# Patient Record
Sex: FEMALE | Race: WHITE | URBAN - METROPOLITAN AREA
[De-identification: names, ages, dates, MRNs, and addresses within clinical notes are randomized per-mention and may not be internally consistent; named-entity substitution may affect disease eponyms.]

---

## 2017-03-15 ENCOUNTER — HOSPITAL ENCOUNTER (OUTPATIENT)
Dept: MAMMOGRAPHY | Age: 57
Discharge: HOME | End: 2017-03-15
Attending: FAMILY MEDICINE | Admitting: FAMILY MEDICINE

## 2017-03-15 DIAGNOSIS — Z12.31 VISIT FOR SCREENING MAMMOGRAM: ICD-10-CM

## 2017-10-03 ENCOUNTER — OFFICE VISIT (OUTPATIENT)
Dept: FAMILY MEDICINE CLINIC | Age: 57
End: 2017-10-03

## 2017-10-03 VITALS
SYSTOLIC BLOOD PRESSURE: 118 MMHG | RESPIRATION RATE: 14 BRPM | BODY MASS INDEX: 23.05 KG/M2 | WEIGHT: 135 LBS | OXYGEN SATURATION: 98 % | HEIGHT: 64 IN | HEART RATE: 71 BPM | DIASTOLIC BLOOD PRESSURE: 76 MMHG

## 2017-10-03 DIAGNOSIS — R11.2 NON-INTRACTABLE VOMITING WITH NAUSEA, UNSPECIFIED VOMITING TYPE: ICD-10-CM

## 2017-10-03 DIAGNOSIS — H81.312 VERTIGO, AURAL, LEFT: Primary | ICD-10-CM

## 2017-10-03 PROCEDURE — 96372 THER/PROPH/DIAG INJ SC/IM: CPT | Performed by: FAMILY MEDICINE

## 2017-10-03 PROCEDURE — 99214 OFFICE O/P EST MOD 30 MIN: CPT | Performed by: FAMILY MEDICINE

## 2017-10-03 RX ORDER — ONDANSETRON 4 MG/1
4 TABLET, FILM COATED ORAL EVERY 6 HOURS PRN
Qty: 6 TABLET | Refills: 0 | Status: SHIPPED | OUTPATIENT
Start: 2017-10-03 | End: 2021-03-22

## 2017-10-03 RX ORDER — AMOXICILLIN 500 MG/1
CAPSULE ORAL
COMMUNITY
Start: 2017-09-28 | End: 2021-03-22

## 2017-10-03 RX ORDER — PROMETHAZINE HYDROCHLORIDE 25 MG/ML
25 INJECTION, SOLUTION INTRAMUSCULAR; INTRAVENOUS ONCE
Status: COMPLETED | OUTPATIENT
Start: 2017-10-03 | End: 2017-10-03

## 2017-10-03 RX ADMIN — PROMETHAZINE HYDROCHLORIDE 25 MG: 25 INJECTION, SOLUTION INTRAMUSCULAR; INTRAVENOUS at 11:22

## 2017-10-03 ASSESSMENT — PATIENT HEALTH QUESTIONNAIRE - PHQ9
1. LITTLE INTEREST OR PLEASURE IN DOING THINGS: 0
SUM OF ALL RESPONSES TO PHQ9 QUESTIONS 1 & 2: 0
SUM OF ALL RESPONSES TO PHQ QUESTIONS 1-9: 0
2. FEELING DOWN, DEPRESSED OR HOPELESS: 0

## 2017-10-05 ENCOUNTER — TELEPHONE (OUTPATIENT)
Dept: FAMILY MEDICINE CLINIC | Age: 57
End: 2017-10-05

## 2017-10-05 RX ORDER — DIAZEPAM 2 MG/1
2 TABLET ORAL EVERY 8 HOURS PRN
Qty: 15 TABLET | Refills: 1 | Status: SHIPPED | OUTPATIENT
Start: 2017-10-05 | End: 2017-10-15

## 2018-02-10 LAB
ALBUMIN SERPL-MCNC: 4.4 G/DL
ALP BLD-CCNC: 72 U/L
ALT SERPL-CCNC: 16 U/L
ANION GAP SERPL CALCULATED.3IONS-SCNC: NORMAL MMOL/L
AST SERPL-CCNC: 26 U/L
AVERAGE GLUCOSE: NORMAL
BASOPHILS ABSOLUTE: 0 /ΜL
BASOPHILS RELATIVE PERCENT: 1 %
BILIRUB SERPL-MCNC: 0.7 MG/DL (ref 0.1–1.4)
BUN BLDV-MCNC: 14 MG/DL
CALCIUM SERPL-MCNC: 9.8 MG/DL
CHLORIDE BLD-SCNC: 104 MMOL/L
CHOLESTEROL, TOTAL: 233 MG/DL
CHOLESTEROL/HDL RATIO: 3
CO2: NORMAL MMOL/L
CREAT SERPL-MCNC: 0.79 MG/DL
EOSINOPHILS ABSOLUTE: 0.1 /ΜL
EOSINOPHILS RELATIVE PERCENT: 1 %
GFR CALCULATED: 83
GLUCOSE BLD-MCNC: 86 MG/DL
HBA1C MFR BLD: 5.5 %
HCT VFR BLD CALC: 48.4 % (ref 36–46)
HDLC SERPL-MCNC: 78 MG/DL (ref 35–70)
HEMOGLOBIN: 16.3 G/DL (ref 12–16)
IRON: 123
LDL CHOLESTEROL CALCULATED: 133 MG/DL (ref 0–160)
LYMPHOCYTES ABSOLUTE: 2 /ΜL
LYMPHOCYTES RELATIVE PERCENT: 30 %
MCH RBC QN AUTO: 32 PG
MCHC RBC AUTO-ENTMCNC: 33.7 G/DL
MCV RBC AUTO: 95 FL
MONOCYTES ABSOLUTE: 0.4 /ΜL
MONOCYTES RELATIVE PERCENT: 6 %
NEUTROPHILS ABSOLUTE: 4 /ΜL
NEUTROPHILS RELATIVE PERCENT: 62 %
PLATELET # BLD: 275 K/ΜL
PMV BLD AUTO: ABNORMAL FL
POTASSIUM SERPL-SCNC: 4.2 MMOL/L
RBC # BLD: 5.1 10^6/ΜL
SODIUM BLD-SCNC: 144 MMOL/L
TOTAL PROTEIN: 7.3
TRIGL SERPL-MCNC: 108 MG/DL
URIC ACID: 5.4
URIC ACID: 5.4
VLDLC SERPL CALC-MCNC: 22 MG/DL
WBC # BLD: 6.5 10^3/ML

## 2018-03-23 ENCOUNTER — HOSPITAL ENCOUNTER (OUTPATIENT)
Dept: MAMMOGRAPHY | Age: 58
Discharge: HOME | End: 2018-03-23
Attending: FAMILY MEDICINE | Admitting: FAMILY MEDICINE

## 2018-03-23 DIAGNOSIS — Z12.31 VISIT FOR SCREENING MAMMOGRAM: ICD-10-CM

## 2018-05-02 ENCOUNTER — OFFICE VISIT (OUTPATIENT)
Dept: FAMILY MEDICINE CLINIC | Age: 58
End: 2018-05-02

## 2018-05-02 VITALS
WEIGHT: 137.2 LBS | SYSTOLIC BLOOD PRESSURE: 130 MMHG | DIASTOLIC BLOOD PRESSURE: 80 MMHG | BODY MASS INDEX: 23.92 KG/M2 | TEMPERATURE: 98.8 F

## 2018-05-02 DIAGNOSIS — R35.0 FREQUENCY OF URINATION: Primary | ICD-10-CM

## 2018-05-02 DIAGNOSIS — Z78.0 MENOPAUSE: ICD-10-CM

## 2018-05-02 LAB
BILIRUBIN, POC: NORMAL
BLOOD URINE, POC: NORMAL
CLARITY, POC: CLEAR
COLOR, POC: YELLOW
GLUCOSE URINE, POC: NORMAL
KETONES, POC: NORMAL
LEUKOCYTE EST, POC: NORMAL
NITRITE, POC: NORMAL
PH, POC: 7
PROTEIN, POC: NORMAL
SPECIFIC GRAVITY, POC: 1.01
UROBILINOGEN, POC: 0.2

## 2018-05-02 PROCEDURE — 81002 URINALYSIS NONAUTO W/O SCOPE: CPT | Performed by: FAMILY MEDICINE

## 2018-05-02 PROCEDURE — 99213 OFFICE O/P EST LOW 20 MIN: CPT | Performed by: FAMILY MEDICINE

## 2018-05-02 RX ORDER — PHENAZOPYRIDINE HYDROCHLORIDE 200 MG/1
200 TABLET, FILM COATED ORAL 3 TIMES DAILY PRN
Qty: 30 TABLET | Refills: 0 | Status: SHIPPED | OUTPATIENT
Start: 2018-05-02 | End: 2018-05-12

## 2018-05-02 ASSESSMENT — ENCOUNTER SYMPTOMS
CONSTIPATION: 0
PHOTOPHOBIA: 0
APNEA: 0
EYE REDNESS: 0
EYE PAIN: 0
EYE ITCHING: 0
WHEEZING: 0
SINUS PRESSURE: 0
RHINORRHEA: 0
EYE DISCHARGE: 0
ABDOMINAL PAIN: 0
COLOR CHANGE: 0
ABDOMINAL DISTENTION: 0
SHORTNESS OF BREATH: 0
SORE THROAT: 0
TROUBLE SWALLOWING: 0
BLOOD IN STOOL: 0
NAUSEA: 0
CHOKING: 0
COUGH: 0
DIARRHEA: 0
SINUS PAIN: 0
VOMITING: 0
BACK PAIN: 0
CHEST TIGHTNESS: 0
VOICE CHANGE: 0

## 2018-05-04 LAB
ORGANISM: ABNORMAL
URINE CULTURE, ROUTINE: ABNORMAL
URINE CULTURE, ROUTINE: ABNORMAL

## 2019-05-21 ENCOUNTER — TELEPHONE (OUTPATIENT)
Dept: FAMILY MEDICINE CLINIC | Age: 59
End: 2019-05-21

## 2019-06-04 ENCOUNTER — OFFICE VISIT (OUTPATIENT)
Dept: FAMILY MEDICINE CLINIC | Age: 59
End: 2019-06-04
Payer: COMMERCIAL

## 2019-06-04 VITALS
TEMPERATURE: 98.7 F | HEART RATE: 82 BPM | SYSTOLIC BLOOD PRESSURE: 124 MMHG | DIASTOLIC BLOOD PRESSURE: 76 MMHG | WEIGHT: 143 LBS | BODY MASS INDEX: 24.93 KG/M2 | OXYGEN SATURATION: 97 % | RESPIRATION RATE: 12 BRPM

## 2019-06-04 DIAGNOSIS — J20.9 ACUTE BRONCHITIS, UNSPECIFIED ORGANISM: Primary | ICD-10-CM

## 2019-06-04 PROCEDURE — 99214 OFFICE O/P EST MOD 30 MIN: CPT | Performed by: FAMILY MEDICINE

## 2019-06-04 RX ORDER — ALBUTEROL SULFATE 90 UG/1
2 AEROSOL, METERED RESPIRATORY (INHALATION) 4 TIMES DAILY PRN
Qty: 3 INHALER | Refills: 1 | Status: SHIPPED | OUTPATIENT
Start: 2019-06-04 | End: 2021-03-22

## 2019-06-04 ASSESSMENT — PATIENT HEALTH QUESTIONNAIRE - PHQ9
SUM OF ALL RESPONSES TO PHQ QUESTIONS 1-9: 0
SUM OF ALL RESPONSES TO PHQ9 QUESTIONS 1 & 2: 0
SUM OF ALL RESPONSES TO PHQ QUESTIONS 1-9: 0
2. FEELING DOWN, DEPRESSED OR HOPELESS: 0
1. LITTLE INTEREST OR PLEASURE IN DOING THINGS: 0

## 2019-06-04 NOTE — PROGRESS NOTES
Subjective:       Elliott Carlson is a 62 y.o. female here for evaluation of a cough. The cough is non-productive, with shortness of breath and is aggravated by nothing. Onset of symptoms was a few days ago, unchanged since that time. Associated symptoms include change in voice and postnasal drip. Patient does not have a history of asthma. Patient has not had recent travel. Patient does not have a history of smoking. Patient  has not had a previous chest x-ray. Patient has not had a PPD done.   Was seen in urgent care put on antibiotic and steroid she is almost done with her antibiotic finished the steroid she feels overall better but the cough has not improved  Past Medical History:   Diagnosis Date    Macular disorder      RT eye CEI she will get Retina specialist    Pneumonia     x 3      Patient Active Problem List    Diagnosis Date Noted    Menopause 06/24/2014    Colon polyp 11/24/2011    Open-angle glaucoma 06/24/2008     Past Surgical History:   Procedure Laterality Date    CATARACT REMOVAL WITH IMPLANT  2014    both eye     GLAUCOMA SURGERY      drainage canal RT  /CEI     Family History   Problem Relation Age of Onset    Other Mother 80        DM,Renal,Neuropathy    Other Father 80        PAD ,Splenectomy,Fall    Cancer Brother 46        pancreas     Social History     Socioeconomic History    Marital status:      Spouse name: ViaCyte Number of children: 2    Years of education: None    Highest education level: None   Occupational History     Comment: [employer]   Social Needs    Financial resource strain: None    Food insecurity:     Worry: None     Inability: None    Transportation needs:     Medical: None     Non-medical: None   Tobacco Use    Smoking status: Never Smoker    Smokeless tobacco: Never Used   Substance and Sexual Activity    Alcohol use: None    Drug use: None    Sexual activity: Yes     Partners: Male     Comment: M 2 kids   Lifestyle    Physical activity:     Days per week: None     Minutes per session: None    Stress: None   Relationships    Social connections:     Talks on phone: None     Gets together: None     Attends Yazidism service: None     Active member of club or organization: None     Attends meetings of clubs or organizations: None     Relationship status: None    Intimate partner violence:     Fear of current or ex partner: None     Emotionally abused: None     Physically abused: None     Forced sexual activity: None   Other Topics Concern    None   Social History Narrative    Daughter 27 y o due 9-2018    Son 21 y o     Current Outpatient Medications   Medication Sig Dispense Refill    Lifitegrast (XIIDRA) 5 % SOLN Apply to eye daily      loteprednol (LOTEMAX) 0.5 % ophthalmic suspension Place 1 drop into the left eye 2 times daily      dorzolamide (TRUSOPT) 2 % ophthalmic solution Place 2 drops into the left eye 2 times daily      difluprednate (DUREZOL) 0.05 % EMUL Place 1 drop into the left eye 3 drops daily      pilocarpine (PILOCAR) 1 % ophthalmic solution Place 1 drop into both eyes 3 times daily.  amoxicillin (AMOXIL) 500 MG capsule       ondansetron (ZOFRAN) 4 MG tablet Take 1 tablet by mouth every 6 hours as needed for Nausea or Vomiting 6 tablet 0    bromfenac (PROLENSA) 0.07 % SOLN 1 drop daily. No current facility-administered medications for this visit. Current Outpatient Medications on File Prior to Visit   Medication Sig Dispense Refill    Lifitegrast (XIIDRA) 5 % SOLN Apply to eye daily      loteprednol (LOTEMAX) 0.5 % ophthalmic suspension Place 1 drop into the left eye 2 times daily      dorzolamide (TRUSOPT) 2 % ophthalmic solution Place 2 drops into the left eye 2 times daily      difluprednate (DUREZOL) 0.05 % EMUL Place 1 drop into the left eye 3 drops daily      pilocarpine (PILOCAR) 1 % ophthalmic solution Place 1 drop into both eyes 3 times daily.       amoxicillin (AMOXIL) 500

## 2019-06-06 ENCOUNTER — TELEPHONE (OUTPATIENT)
Dept: FAMILY MEDICINE CLINIC | Age: 59
End: 2019-06-06

## 2021-03-12 ENCOUNTER — IMMUNIZATION (OUTPATIENT)
Dept: PRIMARY CARE CLINIC | Age: 61
End: 2021-03-12
Payer: COMMERCIAL

## 2021-03-12 PROCEDURE — 91301 COVID-19, MODERNA VACCINE 100MCG/0.5ML DOSE: CPT

## 2021-03-12 PROCEDURE — 0011A COVID-19, MODERNA VACCINE 100MCG/0.5ML DOSE: CPT

## 2021-03-22 ENCOUNTER — OFFICE VISIT (OUTPATIENT)
Dept: PRIMARY CARE CLINIC | Age: 61
End: 2021-03-22
Payer: COMMERCIAL

## 2021-03-22 VITALS
HEART RATE: 83 BPM | HEIGHT: 64 IN | TEMPERATURE: 96.3 F | OXYGEN SATURATION: 99 % | DIASTOLIC BLOOD PRESSURE: 80 MMHG | BODY MASS INDEX: 23.12 KG/M2 | SYSTOLIC BLOOD PRESSURE: 110 MMHG | WEIGHT: 135.4 LBS

## 2021-03-22 DIAGNOSIS — H40.51X4: ICD-10-CM

## 2021-03-22 DIAGNOSIS — H57.89: ICD-10-CM

## 2021-03-22 DIAGNOSIS — Z01.818 PREOPERATIVE CLEARANCE: Primary | ICD-10-CM

## 2021-03-22 PROCEDURE — 99242 OFF/OP CONSLTJ NEW/EST SF 20: CPT | Performed by: FAMILY MEDICINE

## 2021-03-22 RX ORDER — CYCLOSPORINE 0.5 MG/ML
1 EMULSION OPHTHALMIC 2 TIMES DAILY
COMMUNITY

## 2021-03-22 RX ORDER — CHLORAL HYDRATE 500 MG
CAPSULE ORAL
COMMUNITY

## 2021-03-22 RX ORDER — BRIMONIDINE TARTRATE 2 MG/ML
1 SOLUTION/ DROPS OPHTHALMIC 2 TIMES DAILY
COMMUNITY
End: 2022-02-09

## 2021-03-22 SDOH — ECONOMIC STABILITY: INCOME INSECURITY: HOW HARD IS IT FOR YOU TO PAY FOR THE VERY BASICS LIKE FOOD, HOUSING, MEDICAL CARE, AND HEATING?: NOT HARD AT ALL

## 2021-03-22 SDOH — ECONOMIC STABILITY: TRANSPORTATION INSECURITY
IN THE PAST 12 MONTHS, HAS THE LACK OF TRANSPORTATION KEPT YOU FROM MEDICAL APPOINTMENTS OR FROM GETTING MEDICATIONS?: NO

## 2021-03-22 SDOH — ECONOMIC STABILITY: FOOD INSECURITY: WITHIN THE PAST 12 MONTHS, YOU WORRIED THAT YOUR FOOD WOULD RUN OUT BEFORE YOU GOT MONEY TO BUY MORE.: NEVER TRUE

## 2021-03-22 ASSESSMENT — ENCOUNTER SYMPTOMS
WHEEZING: 0
RESPIRATORY NEGATIVE: 1
SINUS PAIN: 0
ABDOMINAL PAIN: 0
TROUBLE SWALLOWING: 0
SORE THROAT: 0
ROS SKIN COMMENTS: DERMATOLOGY ANNUAL VISIT
BACK PAIN: 0
CHEST TIGHTNESS: 0
COUGH: 0

## 2021-03-22 ASSESSMENT — PATIENT HEALTH QUESTIONNAIRE - PHQ9
1. LITTLE INTEREST OR PLEASURE IN DOING THINGS: 0
SUM OF ALL RESPONSES TO PHQ QUESTIONS 1-9: 0
2. FEELING DOWN, DEPRESSED OR HOPELESS: 0

## 2021-03-22 NOTE — PROGRESS NOTES
SUBJECTIVE:  Patient ID: Julio Navarro is a 61 y.o. female.   Chief Complaint:  Chief Complaint   Patient presents with    Pre-op Exam     4/6/21 CEI MD Monica Frankel Chi Revision right eye       HPI   61year old Female  Drainage canal surgery Rt eye 2016  Repeat surgery to remove scar tissue RT eye  Rt Eye Glaucoma  High eye pressure Rt eye     Past Medical History:   Diagnosis Date    Macular disorder      RT eye CEI she will get Retina specialist    Pneumonia     x 3      Past Surgical History:   Procedure Laterality Date    CATARACT REMOVAL WITH IMPLANT  2014    both eye     GLAUCOMA SURGERY      drainage canal RT  /CEI     No Known Allergies    Family History   Problem Relation Age of Onset    Other Mother 80        DM,Renal,Neuropathy    Other Father 80        PAD ,Splenectomy,Fall    Cancer Brother 46        pancreas     Social History     Social History Narrative         [employer]    Daughter 35 +G daughter     Son 21 y o       Patient Active Problem List   Diagnosis    Open-angle glaucoma    Menopause    Colon polyp     Current Outpatient Medications   Medication Sig Dispense Refill    brimonidine (ALPHAGAN) 0.2 % ophthalmic solution Place 1 drop into the left eye 2 times daily      cycloSPORINE (RESTASIS) 0.05 % ophthalmic emulsion Place 1 drop into both eyes 2 times daily      Multiple Vitamin (MULTI-VITAMIN DAILY PO) Take by mouth daily      Omega-3 1000 MG CAPS Take by mouth Two a day      loteprednol (LOTEMAX) 0.5 % ophthalmic suspension Place 1 drop into the left eye 2 times daily 4 x right eye, 1 x left eye      dorzolamide (TRUSOPT) 2 % ophthalmic solution Place 2 drops into the left eye 2 times daily      bromfenac (PROLENSA) 0.07 % SOLN 1 drop daily Right eye      pilocarpine (PILOCAR) 1 % ophthalmic solution Place 1 drop into the left eye 3 times daily       Lifitegrast (XIIDRA) 5 % SOLN Apply to eye daily      difluprednate (DUREZOL) 0.05 % EMUL Place 1 drop into the left eye 3 drops daily       No current facility-administered medications for this visit. Lab Results   Component Value Date    WBC 6.5 02/10/2018    HGB 16.3 (A) 02/10/2018    HCT 48.4 (A) 02/10/2018    MCV 95 02/10/2018     02/10/2018     Lab Results   Component Value Date    CHOL 233 02/10/2018    CHOL 204 03/11/2016    CHOL 196 02/14/2014     Lab Results   Component Value Date    TRIG 108 02/10/2018    TRIG 111 02/14/2014    TRIG 168 10/24/2012     Lab Results   Component Value Date    HDL 78 (A) 02/10/2018    HDL 70 02/14/2014    HDL 65 10/24/2012     Lab Results   Component Value Date    LDLCALC 133 02/10/2018    LDLCALC 104 02/14/2014    LDLCALC 95 10/24/2012     Lab Results   Component Value Date    VLDL 22 02/10/2018    VLDL 22 02/14/2014    VLDL 34 10/24/2012     Lab Results   Component Value Date    CHOLHDLRATIO 3.0 02/10/2018    CHOLHDLRATIO 2.8 02/14/2014    CHOLHDLRATIO 3.0 10/24/2011       Chemistry        Component Value Date/Time     02/10/2018    K 4.2 02/10/2018     02/10/2018    CO2 26 02/14/2014    BUN 14 02/10/2018    CREATININE 0.79 02/10/2018        Component Value Date/Time    CALCIUM 9.8 02/10/2018    ALKPHOS 72 02/10/2018    AST 26 02/10/2018    ALT 16 02/10/2018    BILITOT 0.7 02/10/2018    BILITOT 0.5 10/24/2011        Lab Results   Component Value Date    TSH 3.390 02/14/2014     Lab Results   Component Value Date    LABA1C 5.5 02/10/2018     No results found for: EAG      Review of Systems   Constitutional:        Over all great   HENT: Negative for congestion, sinus pain, sore throat and trouble swallowing. Eyes:        RT eye disorder surgery  4-6-2021  Surgery Lt eye Glaucoma  Multiple eye drops   Respiratory: Negative. Negative for cough, chest tightness and wheezing. No tobacco   Cardiovascular: Negative for chest pain, palpitations and leg swelling. Gastrointestinal: Negative for abdominal pain.         BM regular  Due colonoscopy due this year   Endocrine: Negative. Genitourinary: Negative for dysuria, flank pain and urgency. GYN due 9-2021 @  Mammogram due 5-7-2021   Musculoskeletal: Negative for back pain, gait problem and neck pain. Skin:        Dermatology annual visit   Allergic/Immunologic: Negative for environmental allergies. ?cat ? sneeze   Neurological: Negative for dizziness, light-headedness and headaches. Hematological: Negative. Psychiatric/Behavioral: Negative. Negative for behavioral problems and sleep disturbance. The patient is not nervous/anxious.  will drive her       OBJECTIVE:  /80 (Site: Right Upper Arm, Position: Sitting, Cuff Size: Medium Adult)   Pulse 83   Temp 96.3 °F (35.7 °C) (Infrared)   Ht 5' 3.5\" (1.613 m)   Wt 135 lb 6.4 oz (61.4 kg)   LMP 06/08/2013   SpO2 99%   BMI 23.61 kg/m²   Physical Exam  Vitals signs reviewed. Constitutional:       General: She is not in acute distress. Appearance: She is well-developed. She is not diaphoretic. HENT:      Head: Normocephalic and atraumatic. Right Ear: External ear normal.      Left Ear: External ear normal.      Nose: Nose normal.      Mouth/Throat:      Pharynx: No oropharyngeal exudate. Eyes:      General:         Right eye: No discharge. Left eye: No discharge. Extraocular Movements: Extraocular movements intact. Pupils: Pupils are equal, round, and reactive to light. Neck:      Musculoskeletal: Normal range of motion and neck supple. Thyroid: No thyromegaly. Cardiovascular:      Rate and Rhythm: Normal rate and regular rhythm. Heart sounds: Normal heart sounds. No murmur. Pulmonary:      Effort: Pulmonary effort is normal. No respiratory distress. Breath sounds: Normal breath sounds. No wheezing or rales. Chest:      Chest wall: No tenderness. Abdominal:      General: There is no distension. Palpations: There is no mass. Tenderness: There is no abdominal tenderness. There is no guarding or rebound. Musculoskeletal:         General: No tenderness or deformity. Lymphadenopathy:      Cervical: No cervical adenopathy. Skin:     General: Skin is warm. Findings: No rash. Neurological:      Mental Status: She is oriented to person, place, and time. Motor: No abnormal muscle tone. Deep Tendon Reflexes: Reflexes are normal and symmetric. Reflexes normal.   Psychiatric:         Behavior: Behavior normal.         Thought Content: Thought content normal.         Judgment: Judgment normal.         ASSESSMENT/PLAN:      Diagnosis Orders   1. Preoperative clearance     2.  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

## 2021-04-09 ENCOUNTER — IMMUNIZATION (OUTPATIENT)
Dept: PRIMARY CARE CLINIC | Age: 61
End: 2021-04-09
Payer: COMMERCIAL

## 2021-04-09 PROCEDURE — 91301 COVID-19, MODERNA VACCINE 100MCG/0.5ML DOSE: CPT | Performed by: FAMILY MEDICINE

## 2021-04-09 PROCEDURE — 0012A COVID-19, MODERNA VACCINE 100MCG/0.5ML DOSE: CPT | Performed by: FAMILY MEDICINE

## 2021-10-07 LAB — PAP SMEAR, EXTERNAL: NEGATIVE

## 2022-02-09 ENCOUNTER — HOSPITAL ENCOUNTER (OUTPATIENT)
Dept: GENERAL RADIOLOGY | Age: 62
Discharge: HOME | End: 2022-02-09
Payer: COMMERCIAL

## 2022-02-09 ENCOUNTER — OFFICE VISIT (OUTPATIENT)
Dept: PRIMARY CARE CLINIC | Age: 62
End: 2022-02-09
Payer: COMMERCIAL

## 2022-02-09 VITALS
DIASTOLIC BLOOD PRESSURE: 76 MMHG | HEART RATE: 95 BPM | SYSTOLIC BLOOD PRESSURE: 114 MMHG | OXYGEN SATURATION: 98 % | HEIGHT: 64 IN | BODY MASS INDEX: 23.9 KG/M2 | WEIGHT: 140 LBS | TEMPERATURE: 97.7 F

## 2022-02-09 DIAGNOSIS — R07.81 RIB PAIN ON LEFT SIDE: ICD-10-CM

## 2022-02-09 DIAGNOSIS — R07.81 RIB PAIN ON LEFT SIDE: Primary | ICD-10-CM

## 2022-02-09 PROCEDURE — 71100 X-RAY EXAM RIBS UNI 2 VIEWS: CPT

## 2022-02-09 PROCEDURE — 99214 OFFICE O/P EST MOD 30 MIN: CPT | Performed by: FAMILY MEDICINE

## 2022-02-09 RX ORDER — BRIMONIDINE TARTRATE 0.15 %
DROPS OPHTHALMIC (EYE)
COMMUNITY

## 2022-02-09 ASSESSMENT — ENCOUNTER SYMPTOMS
WHEEZING: 0
ABDOMINAL PAIN: 0
CHEST TIGHTNESS: 0
EYES NEGATIVE: 1
SHORTNESS OF BREATH: 0
BACK PAIN: 0

## 2022-02-09 NOTE — PROGRESS NOTES
SUBJECTIVE:  Patient ID: Ernestina Bailey is a 64 y.o. female. Chief Complaint:  Chief Complaint   Patient presents with    Fall     2/5/22 on her driveway left flank pain, hurts to take deep breath       HPI   64year old female  Urgent visit  FT @[facility]   Valerie Cat on ice drive way   She was up Then down  ? May be left side pain Lat   Bruise Left side thigh  No head trauma  No loss of conscious  No neck or back pain   was standing there but he didn't see her while falling     Past Medical History:   Diagnosis Date    Macular disorder      RT eye CEI she will get Retina specialist    Pneumonia     x 3      Past Surgical History:   Procedure Laterality Date    CATARACT REMOVAL WITH IMPLANT  2014    both eye     GLAUCOMA SURGERY      drainage canal RT  /CEI     No Known Allergies    Family History   Problem Relation Age of Onset    Other Mother 80        DM,Renal,Neuropathy    Other Father 80        PAD ,Splenectomy,Fall    Cancer Brother 46        pancreas     Social History     Social History Narrative              [employer]    Daughter 35 +G daughter     Son 21 y o       Patient Active Problem List   Diagnosis    Open-angle glaucoma    Menopause    Colon polyp     Current Outpatient Medications   Medication Sig Dispense Refill    brimonidine (ALPHAGAN P) 0.15 % ophthalmic solution Apply to eye      cycloSPORINE (RESTASIS) 0.05 % ophthalmic emulsion Place 1 drop into both eyes 2 times daily      Multiple Vitamin (MULTI-VITAMIN DAILY PO) Take by mouth daily      Omega-3 1000 MG CAPS Take by mouth Two a day      dorzolamide (TRUSOPT) 2 % ophthalmic solution Place 2 drops into the left eye 2 times daily      difluprednate (DUREZOL) 0.05 % EMUL Place 1 drop into the left eye 3 drops daily      bromfenac (PROLENSA) 0.07 % SOLN 1 drop daily Right eye      pilocarpine (PILOCAR) 1 % ophthalmic solution Place 1 drop into the left eye 3 times daily        No current facility-administered medications for this visit. Lab Results   Component Value Date    WBC 6.5 02/10/2018    HGB 16.3 (A) 02/10/2018    HCT 48.4 (A) 02/10/2018    MCV 95 02/10/2018     02/10/2018     Lab Results   Component Value Date    CHOL 233 02/10/2018    CHOL 204 03/11/2016    CHOL 196 02/14/2014     Lab Results   Component Value Date    TRIG 108 02/10/2018    TRIG 111 02/14/2014    TRIG 168 10/24/2012     Lab Results   Component Value Date    HDL 78 (A) 02/10/2018    HDL 70 02/14/2014    HDL 65 10/24/2012     Lab Results   Component Value Date    LDLCALC 133 02/10/2018    LDLCALC 104 02/14/2014    LDLCALC 95 10/24/2012     Lab Results   Component Value Date    VLDL 22 02/10/2018    VLDL 22 02/14/2014    VLDL 34 10/24/2012     Lab Results   Component Value Date    CHOLHDLRATIO 3.0 02/10/2018    CHOLHDLRATIO 2.8 02/14/2014    CHOLHDLRATIO 3.0 10/24/2011       Chemistry        Component Value Date/Time     02/10/2018 0000    K 4.2 02/10/2018 0000     02/10/2018 0000    CO2 26 02/14/2014 0000    BUN 14 02/10/2018 0000    CREATININE 0.79 02/10/2018 0000        Component Value Date/Time    CALCIUM 9.8 02/10/2018 0000    ALKPHOS 72 02/10/2018 0000    AST 26 02/10/2018 0000    ALT 16 02/10/2018 0000    BILITOT 0.7 02/10/2018 0000    BILITOT 0.5 10/24/2011 0000            Review of Systems   Constitutional: Negative. HENT: Negative. Eyes: Negative. Respiratory: Negative for chest tightness, shortness of breath and wheezing. Left rib area pain   Cardiovascular: Negative for chest pain, palpitations and leg swelling. Gastrointestinal: Negative for abdominal pain. Genitourinary: Negative for dysuria and flank pain. Musculoskeletal: Negative for back pain, gait problem and neck pain. Neurological: Negative for dizziness and headaches.        OBJECTIVE:  /76 (Site: Right Upper Arm, Position: Sitting, Cuff Size: Medium Adult)   Pulse 95   Temp 97.7 °F (36.5 °C) (Infrared)   Ht 5' 3.5\" (1.613 m)   Wt 140 lb (63.5 kg)   LMP 06/08/2013   SpO2 98%   BMI 24.41 kg/m²   Physical Exam  HENT:      Head: Normocephalic. Eyes:      Extraocular Movements: Extraocular movements intact. Pupils: Pupils are equal, round, and reactive to light. Cardiovascular:      Rate and Rhythm: Normal rate and regular rhythm. Heart sounds: Normal heart sounds. Pulmonary:      Effort: Pulmonary effort is normal.      Breath sounds: Normal breath sounds. No wheezing or rhonchi. Comments: Tender Left rib area  Musculoskeletal:      Cervical back: Normal range of motion and neck supple. Skin:     Comments: Bruise left thigh small area   Neurological:      General: No focal deficit present. Mental Status: She is alert and oriented to person, place, and time. ASSESSMENT/PLAN:      Diagnosis Orders   1.  Rib pain on left side  XR RIBS LEFT (2 VIEWS)     Urgent visit  X ray

## 2022-02-11 ENCOUNTER — TELEPHONE (OUTPATIENT)
Dept: PRIMARY CARE CLINIC | Age: 62
End: 2022-02-11

## 2022-07-22 ENCOUNTER — OFFICE VISIT (OUTPATIENT)
Dept: PRIMARY CARE CLINIC | Age: 62
End: 2022-07-22
Payer: COMMERCIAL

## 2022-07-22 VITALS
OXYGEN SATURATION: 98 % | HEART RATE: 80 BPM | DIASTOLIC BLOOD PRESSURE: 82 MMHG | BODY MASS INDEX: 24.03 KG/M2 | WEIGHT: 135.6 LBS | HEIGHT: 63 IN | SYSTOLIC BLOOD PRESSURE: 104 MMHG | TEMPERATURE: 97.3 F

## 2022-07-22 DIAGNOSIS — Z00.00 ROUTINE PHYSICAL EXAMINATION: Primary | ICD-10-CM

## 2022-07-22 DIAGNOSIS — Z00.00 ROUTINE PHYSICAL EXAMINATION: ICD-10-CM

## 2022-07-22 DIAGNOSIS — Z13.220 LIPID SCREENING: ICD-10-CM

## 2022-07-22 DIAGNOSIS — Z13.1 DIABETES MELLITUS SCREENING: ICD-10-CM

## 2022-07-22 DIAGNOSIS — Z11.59 ENCOUNTER FOR HCV SCREENING TEST FOR LOW RISK PATIENT: ICD-10-CM

## 2022-07-22 LAB
A/G RATIO: 1.5 (ref 1.1–2.2)
ALBUMIN SERPL-MCNC: 4.6 G/DL (ref 3.4–5)
ALP BLD-CCNC: 71 U/L (ref 40–129)
ALT SERPL-CCNC: 13 U/L (ref 10–40)
ANION GAP SERPL CALCULATED.3IONS-SCNC: 11 MMOL/L (ref 3–16)
AST SERPL-CCNC: 23 U/L (ref 15–37)
BASOPHILS ABSOLUTE: 0 K/UL (ref 0–0.2)
BASOPHILS RELATIVE PERCENT: 0.6 %
BILIRUB SERPL-MCNC: 0.7 MG/DL (ref 0–1)
BUN BLDV-MCNC: 13 MG/DL (ref 7–20)
CALCIUM SERPL-MCNC: 10.2 MG/DL (ref 8.3–10.6)
CHLORIDE BLD-SCNC: 105 MMOL/L (ref 99–110)
CHOLESTEROL, TOTAL: 240 MG/DL (ref 0–199)
CO2: 27 MMOL/L (ref 21–32)
CREAT SERPL-MCNC: 0.6 MG/DL (ref 0.6–1.2)
EOSINOPHILS ABSOLUTE: 0 K/UL (ref 0–0.6)
EOSINOPHILS RELATIVE PERCENT: 0.8 %
GFR AFRICAN AMERICAN: >60
GFR NON-AFRICAN AMERICAN: >60
GLUCOSE BLD-MCNC: 103 MG/DL (ref 70–99)
HCT VFR BLD CALC: 46.1 % (ref 36–48)
HDLC SERPL-MCNC: 83 MG/DL (ref 40–60)
HEMOGLOBIN: 15.5 G/DL (ref 12–16)
HEPATITIS C ANTIBODY INTERPRETATION: NORMAL
LDL CHOLESTEROL CALCULATED: 141 MG/DL
LYMPHOCYTES ABSOLUTE: 1.7 K/UL (ref 1–5.1)
LYMPHOCYTES RELATIVE PERCENT: 27.8 %
MCH RBC QN AUTO: 31.9 PG (ref 26–34)
MCHC RBC AUTO-ENTMCNC: 33.6 G/DL (ref 31–36)
MCV RBC AUTO: 95.1 FL (ref 80–100)
MONOCYTES ABSOLUTE: 0.4 K/UL (ref 0–1.3)
MONOCYTES RELATIVE PERCENT: 6.1 %
NEUTROPHILS ABSOLUTE: 4 K/UL (ref 1.7–7.7)
NEUTROPHILS RELATIVE PERCENT: 64.7 %
PDW BLD-RTO: 13.9 % (ref 12.4–15.4)
PLATELET # BLD: 250 K/UL (ref 135–450)
PMV BLD AUTO: 7.6 FL (ref 5–10.5)
POTASSIUM SERPL-SCNC: 5.1 MMOL/L (ref 3.5–5.1)
RBC # BLD: 4.84 M/UL (ref 4–5.2)
SODIUM BLD-SCNC: 143 MMOL/L (ref 136–145)
TOTAL PROTEIN: 7.7 G/DL (ref 6.4–8.2)
TRIGL SERPL-MCNC: 81 MG/DL (ref 0–150)
TSH SERPL DL<=0.05 MIU/L-ACNC: 2.17 UIU/ML (ref 0.27–4.2)
VLDLC SERPL CALC-MCNC: 16 MG/DL
WBC # BLD: 6.3 K/UL (ref 4–11)

## 2022-07-22 PROCEDURE — 99396 PREV VISIT EST AGE 40-64: CPT | Performed by: FAMILY MEDICINE

## 2022-07-22 SDOH — ECONOMIC STABILITY: FOOD INSECURITY: WITHIN THE PAST 12 MONTHS, THE FOOD YOU BOUGHT JUST DIDN'T LAST AND YOU DIDN'T HAVE MONEY TO GET MORE.: NEVER TRUE

## 2022-07-22 SDOH — HEALTH STABILITY: PHYSICAL HEALTH: ON AVERAGE, HOW MANY MINUTES DO YOU ENGAGE IN EXERCISE AT THIS LEVEL?: 20 MIN

## 2022-07-22 SDOH — ECONOMIC STABILITY: FOOD INSECURITY: WITHIN THE PAST 12 MONTHS, YOU WORRIED THAT YOUR FOOD WOULD RUN OUT BEFORE YOU GOT MONEY TO BUY MORE.: NEVER TRUE

## 2022-07-22 SDOH — ECONOMIC STABILITY: HOUSING INSECURITY
IN THE LAST 12 MONTHS, WAS THERE A TIME WHEN YOU DID NOT HAVE A STEADY PLACE TO SLEEP OR SLEPT IN A SHELTER (INCLUDING NOW)?: NO

## 2022-07-22 SDOH — HEALTH STABILITY: PHYSICAL HEALTH: ON AVERAGE, HOW MANY DAYS PER WEEK DO YOU ENGAGE IN MODERATE TO STRENUOUS EXERCISE (LIKE A BRISK WALK)?: 5 DAYS

## 2022-07-22 SDOH — ECONOMIC STABILITY: INCOME INSECURITY: IN THE LAST 12 MONTHS, WAS THERE A TIME WHEN YOU WERE NOT ABLE TO PAY THE MORTGAGE OR RENT ON TIME?: NO

## 2022-07-22 SDOH — ECONOMIC STABILITY: TRANSPORTATION INSECURITY
IN THE PAST 12 MONTHS, HAS LACK OF TRANSPORTATION KEPT YOU FROM MEETINGS, WORK, OR FROM GETTING THINGS NEEDED FOR DAILY LIVING?: NO

## 2022-07-22 SDOH — ECONOMIC STABILITY: HOUSING INSECURITY: IN THE LAST 12 MONTHS, HOW MANY PLACES HAVE YOU LIVED?: 1

## 2022-07-22 ASSESSMENT — ENCOUNTER SYMPTOMS
RESPIRATORY NEGATIVE: 1
ABDOMINAL PAIN: 0
EYES NEGATIVE: 1
BACK PAIN: 0
ALLERGIC/IMMUNOLOGIC NEGATIVE: 1

## 2022-07-22 ASSESSMENT — PATIENT HEALTH QUESTIONNAIRE - PHQ9
SUM OF ALL RESPONSES TO PHQ QUESTIONS 1-9: 0
SUM OF ALL RESPONSES TO PHQ QUESTIONS 1-9: 0
1. LITTLE INTEREST OR PLEASURE IN DOING THINGS: 0
SUM OF ALL RESPONSES TO PHQ9 QUESTIONS 1 & 2: 0
2. FEELING DOWN, DEPRESSED OR HOPELESS: 0
SUM OF ALL RESPONSES TO PHQ QUESTIONS 1-9: 0
SUM OF ALL RESPONSES TO PHQ QUESTIONS 1-9: 0

## 2022-07-22 ASSESSMENT — LIFESTYLE VARIABLES: HOW OFTEN DO YOU HAVE A DRINK CONTAINING ALCOHOL: NEVER

## 2022-07-22 ASSESSMENT — SOCIAL DETERMINANTS OF HEALTH (SDOH): HOW HARD IS IT FOR YOU TO PAY FOR THE VERY BASICS LIKE FOOD, HOUSING, MEDICAL CARE, AND HEATING?: NOT HARD AT ALL

## 2022-07-22 NOTE — PROGRESS NOTES
SUBJECTIVE:  Patient ID: Santos Michelle is a 58 y.o. female. Chief Complaint:  Chief Complaint   Patient presents with    Annual Exam       HPI  58year old Female  Annual    Past Medical History:   Diagnosis Date    Macular disorder      RT eye CEI she will get Retina specialist    Pneumonia     x 3      Past Surgical History:   Procedure Laterality Date    CATARACT REMOVAL WITH IMPLANT  2014    both eye     GLAUCOMA SURGERY      drainage canal RT  /CEI     No Known Allergies    Family History   Problem Relation Age of Onset    Other Mother 80        DM,Renal,Neuropathy    Other Father 80        PAD ,Splenectomy,Fall    Cancer Brother 46        pancreas     Social History     Social History Narrative         [employer]    Daughter 29 +G daughter 3 y & new baby girl    Son 25 y o       Patient Active Problem List   Diagnosis    Open-angle glaucoma    Menopause    Colon polyp     Current Outpatient Medications   Medication Sig Dispense Refill    NONFORMULARY [facility]  two capsules daily      brimonidine (ALPHAGAN P) 0.15 % ophthalmic solution Apply to eye      cycloSPORINE (RESTASIS) 0.05 % ophthalmic emulsion Place 1 drop into both eyes 2 times daily      Multiple Vitamin (MULTI-VITAMIN DAILY PO) Take by mouth daily      Omega-3 1000 MG CAPS Take by mouth Two a day      dorzolamide (TRUSOPT) 2 % ophthalmic solution Place 2 drops into the left eye 2 times daily      difluprednate (DUREZOL) 0.05 % EMUL Place 1 drop into the left eye 3 drops daily      bromfenac (PROLENSA) 0.07 % SOLN 1 drop daily Right eye      pilocarpine (PILOCAR) 1 % ophthalmic solution Place 1 drop into the left eye 3 times daily       diclofenac (VOLTAREN) 50 MG EC tablet Take 1 tablet by mouth 2 times daily (Patient not taking: Reported on 7/22/2022) 30 tablet 0     No current facility-administered medications for this visit.      Lab Results   Component Value Date    WBC 6.5 02/10/2018    HGB 16.3 (A) 02/10/2018    HCT 48.4 (A) 02/10/2018    MCV 95 02/10/2018     02/10/2018     Lab Results   Component Value Date    CHOL 233 02/10/2018    CHOL 204 03/11/2016    CHOL 196 02/14/2014     Lab Results   Component Value Date    TRIG 108 02/10/2018    TRIG 111 02/14/2014    TRIG 168 10/24/2012     Lab Results   Component Value Date    HDL 78 (A) 02/10/2018    HDL 70 02/14/2014    HDL 65 10/24/2012     Lab Results   Component Value Date    LDLCALC 133 02/10/2018    LDLCALC 104 02/14/2014    LDLCALC 95 10/24/2012     Lab Results   Component Value Date    VLDL 22 02/10/2018    VLDL 22 02/14/2014    VLDL 34 10/24/2012     Lab Results   Component Value Date    CHOLHDLRATIO 3.0 02/10/2018    CHOLHDLRATIO 2.8 02/14/2014    CHOLHDLRATIO 3.0 10/24/2011       Chemistry        Component Value Date/Time     02/10/2018 0000    K 4.2 02/10/2018 0000     02/10/2018 0000    CO2 26 02/14/2014 0000    BUN 14 02/10/2018 0000    CREATININE 0.79 02/10/2018 0000        Component Value Date/Time    CALCIUM 9.8 02/10/2018 0000    ALKPHOS 72 02/10/2018 0000    AST 26 02/10/2018 0000    ALT 16 02/10/2018 0000    BILITOT 0.7 02/10/2018 0000    BILITOT 0.5 10/24/2011 0000            Review of Systems   Constitutional:         Well  [place]   HENT: Negative. Eyes: Negative. Glaucoma  Lamelar Macular   Rt eye  Retina specialist  CEI   Respiratory: Negative. Cardiovascular: Negative. Negative for chest pain, palpitations and leg swelling. Gastrointestinal:  Negative for abdominal pain. BM good  Colonoscopy up to date   Endocrine: Negative. Genitourinary:  Negative for dysuria and flank pain. GYN  Sept 2022  Mammogram done   Musculoskeletal:  Negative for back pain, gait problem and neck pain. Skin:         Dermatology Dr Martin Amezcua by [facility] today    Allergic/Immunologic: Negative. Neurological: Negative. Negative for dizziness and headaches. Hematological: Negative. Psychiatric/Behavioral:  Negative for behavioral problems, confusion, decreased concentration, dysphoric mood, hallucinations, self-injury, sleep disturbance and suicidal ideas. The patient is not nervous/anxious and is not hyperactive. OBJECTIVE:  /82 (Site: Right Upper Arm, Position: Sitting, Cuff Size: Medium Adult)   Pulse 80   Temp 97.3 °F (36.3 °C) (Infrared)   Ht 5' 2.5\" (1.588 m)   Wt 135 lb 9.6 oz (61.5 kg)   LMP 06/08/2013   SpO2 98%   BMI 24.41 kg/m²   Physical Exam  HENT:      Head: Normocephalic. Right Ear: Tympanic membrane normal.      Left Ear: Tympanic membrane normal.   Eyes:      Extraocular Movements: Extraocular movements intact. Pupils: Pupils are equal, round, and reactive to light. Cardiovascular:      Rate and Rhythm: Normal rate and regular rhythm. Pulses: Normal pulses. Heart sounds: Normal heart sounds. Pulmonary:      Effort: Pulmonary effort is normal.      Breath sounds: Normal breath sounds. Musculoskeletal:         General: Normal range of motion. Cervical back: Normal range of motion and neck supple. Right lower leg: No edema. Left lower leg: No edema. Skin:     Findings: No rash. Neurological:      General: No focal deficit present. Mental Status: She is alert and oriented to person, place, and time. Psychiatric:         Mood and Affect: Mood normal.         Behavior: Behavior normal.         Thought Content: Thought content normal.         Judgment: Judgment normal.       ASSESSMENT/PLAN:      Diagnosis Orders   1. Routine physical examination  CBC with Auto Differential    Comprehensive Metabolic Panel    Hemoglobin A1C    Lipid Panel    TSH      2. Lipid screening  Lipid Panel      3. Diabetes mellitus screening  Hemoglobin A1C      4.  Encounter for HCV screening test for low risk patient  Hepatitis C Antibody        As above  Mammogram up to date  Colonoscopy up to date

## 2022-07-23 LAB
ESTIMATED AVERAGE GLUCOSE: 111.2 MG/DL
HBA1C MFR BLD: 5.5 %

## 2023-01-09 ENCOUNTER — TELEPHONE (OUTPATIENT)
Dept: PRIMARY CARE CLINIC | Age: 63
End: 2023-01-09

## 2023-01-09 DIAGNOSIS — E78.9 LIPID DISORDER: ICD-10-CM

## 2023-01-09 DIAGNOSIS — Z13.1 DIABETES MELLITUS SCREENING: Primary | ICD-10-CM

## 2023-01-09 NOTE — TELEPHONE ENCOUNTER
----- Message from Johan French sent at 1/9/2023  3:12 PM EST -----  Subject: Referral Request    Reason for referral request? Repeat labs from most recent appt. She thinks   it is for glucose & cholesterol. She wasn't sure if there was anything   else she wanted or not. Provider patient wants to be referred to(if known):     Provider Phone Number(if known):     Additional Information for Provider?   ---------------------------------------------------------------------------  --------------  [id number]    [phone]; OK to leave message on voicemail  ---------------------------------------------------------------------------  --------------

## 2023-01-20 DIAGNOSIS — E78.9 LIPID DISORDER: Primary | ICD-10-CM

## 2023-01-20 RX ORDER — ROSUVASTATIN CALCIUM 10 MG/1
10 TABLET, COATED ORAL NIGHTLY
Qty: 30 TABLET | Refills: 3 | Status: SHIPPED | OUTPATIENT
Start: 2023-01-20

## 2023-01-23 DIAGNOSIS — E78.9 LIPID DISORDER: Primary | ICD-10-CM

## 2023-04-26 ENCOUNTER — OFFICE VISIT (OUTPATIENT)
Dept: PRIMARY CARE CLINIC | Age: 63
End: 2023-04-26
Payer: COMMERCIAL

## 2023-04-26 ENCOUNTER — HOSPITAL ENCOUNTER (OUTPATIENT)
Dept: GENERAL RADIOLOGY | Age: 63
Discharge: HOME | End: 2023-04-26
Payer: COMMERCIAL

## 2023-04-26 VITALS
BODY MASS INDEX: 23.21 KG/M2 | OXYGEN SATURATION: 99 % | HEART RATE: 75 BPM | WEIGHT: 131 LBS | TEMPERATURE: 96.8 F | DIASTOLIC BLOOD PRESSURE: 88 MMHG | SYSTOLIC BLOOD PRESSURE: 104 MMHG | HEIGHT: 63 IN | RESPIRATION RATE: 16 BRPM

## 2023-04-26 DIAGNOSIS — M54.9 MID BACK PAIN: Primary | ICD-10-CM

## 2023-04-26 DIAGNOSIS — R06.02 SHORTNESS OF BREATH: ICD-10-CM

## 2023-04-26 DIAGNOSIS — S22.32XK CLOSED FRACTURE OF ONE RIB OF LEFT SIDE WITH NONUNION, SUBSEQUENT ENCOUNTER: ICD-10-CM

## 2023-04-26 DIAGNOSIS — M54.9 MID BACK PAIN: ICD-10-CM

## 2023-04-26 DIAGNOSIS — Z78.0 MENOPAUSE: ICD-10-CM

## 2023-04-26 PROCEDURE — 99214 OFFICE O/P EST MOD 30 MIN: CPT | Performed by: FAMILY MEDICINE

## 2023-04-26 PROCEDURE — 93000 ELECTROCARDIOGRAM COMPLETE: CPT | Performed by: FAMILY MEDICINE

## 2023-04-26 PROCEDURE — 72072 X-RAY EXAM THORAC SPINE 3VWS: CPT

## 2023-04-26 ASSESSMENT — ENCOUNTER SYMPTOMS
ABDOMINAL PAIN: 0
SHORTNESS OF BREATH: 1
EYES NEGATIVE: 1
BACK PAIN: 1

## 2023-04-26 ASSESSMENT — PATIENT HEALTH QUESTIONNAIRE - PHQ9
9. THOUGHTS THAT YOU WOULD BE BETTER OFF DEAD, OR OF HURTING YOURSELF: 0
SUM OF ALL RESPONSES TO PHQ QUESTIONS 1-9: 0
1. LITTLE INTEREST OR PLEASURE IN DOING THINGS: 0
SUM OF ALL RESPONSES TO PHQ QUESTIONS 1-9: 0
5. POOR APPETITE OR OVEREATING: 0
2. FEELING DOWN, DEPRESSED OR HOPELESS: 0
8. MOVING OR SPEAKING SO SLOWLY THAT OTHER PEOPLE COULD HAVE NOTICED. OR THE OPPOSITE, BEING SO FIGETY OR RESTLESS THAT YOU HAVE BEEN MOVING AROUND A LOT MORE THAN USUAL: 0
SUM OF ALL RESPONSES TO PHQ QUESTIONS 1-9: 0
SUM OF ALL RESPONSES TO PHQ QUESTIONS 1-9: 0
10. IF YOU CHECKED OFF ANY PROBLEMS, HOW DIFFICULT HAVE THESE PROBLEMS MADE IT FOR YOU TO DO YOUR WORK, TAKE CARE OF THINGS AT HOME, OR GET ALONG WITH OTHER PEOPLE: 0
SUM OF ALL RESPONSES TO PHQ9 QUESTIONS 1 & 2: 0
6. FEELING BAD ABOUT YOURSELF - OR THAT YOU ARE A FAILURE OR HAVE LET YOURSELF OR YOUR FAMILY DOWN: 0
7. TROUBLE CONCENTRATING ON THINGS, SUCH AS READING THE NEWSPAPER OR WATCHING TELEVISION: 0
4. FEELING TIRED OR HAVING LITTLE ENERGY: 0
3. TROUBLE FALLING OR STAYING ASLEEP: 0

## 2023-04-26 NOTE — PROGRESS NOTES
SUBJECTIVE:  Patient ID: Argentina Duarte is a 58 y.o. female. Chief Complaint:  Chief Complaint   Patient presents with    Back Pain    Cholesterol Problem       HPI  58year old Female  Hx Fall on ice Feb 2022 Fx Lt rib  It took long time to heal  Issue with lifting or sneeze or hiccup  Exercise hard for her take deep breath  ? Lt rib cage looks different  When she try to run get pain mid back area   Mid back get +Muscle get spasm + Burning sensation    Past Medical History:   Diagnosis Date    Macular disorder      RT eye CEI she will get Retina specialist    Pneumonia     x 3      Past Surgical History:   Procedure Laterality Date    CATARACT REMOVAL WITH IMPLANT  2014    both eye     GLAUCOMA SURGERY      drainage canal RT  /CEI     No Known Allergies    Family History   Problem Relation Age of Onset    Other Mother 80        DM,Renal,Neuropathy    Other Father 80        PAD ,Splenectomy,Fall    Cancer Brother 46        pancreas     Social History     Social History Narrative              [employer] [facility]    Daughter 36+G daughter 3 y & baby girl 3year old    Son 32 y o moved to [facility]  +GF in program PhD       Patient Active Problem List   Diagnosis    Open-angle glaucoma    Menopause    Colon polyp     Current Outpatient Medications   Medication Sig Dispense Refill    rosuvastatin (CRESTOR) 10 MG tablet Take 1 tablet by mouth nightly 30 tablet 3    NONFORMULARY [facility]  two capsules daily      brimonidine (ALPHAGAN P) 0.15 % ophthalmic solution Apply to eye      cycloSPORINE (RESTASIS) 0.05 % ophthalmic emulsion Place 1 drop into both eyes 2 times daily      Multiple Vitamin (MULTI-VITAMIN DAILY PO) Take by mouth daily      Omega-3 1000 MG CAPS Take by mouth Two a day      dorzolamide (TRUSOPT) 2 % ophthalmic solution Place 2 drops into the left eye 2 times daily      difluprednate (DUREZOL) 0.05 % EMUL Place 1 drop into the left eye 3 drops daily      bromfenac (PROLENSA)

## 2023-05-01 DIAGNOSIS — E78.9 LIPID DISORDER: ICD-10-CM

## 2023-05-01 RX ORDER — ROSUVASTATIN CALCIUM 10 MG/1
10 TABLET, COATED ORAL NIGHTLY
Qty: 90 TABLET | Refills: 1 | Status: SHIPPED | OUTPATIENT
Start: 2023-05-01

## 2023-05-01 NOTE — TELEPHONE ENCOUNTER
Medication:   Requested Prescriptions     Pending Prescriptions Disp Refills    rosuvastatin (CRESTOR) 10 MG tablet 30 tablet 3     Sig: Take 1 tablet by mouth nightly     Last Filled:  1.20.23  Last appt: 4/26/2023   Next appt: Visit date not found    Last Lipid:   Lab Results   Component Value Date/Time    CHOL 177 04/13/2023 08:02 AM    TRIG 63 04/13/2023 08:02 AM     04/13/2023 08:02 AM    HDL 67 10/24/2011 12:00 AM    LDLCALC 63 04/13/2023 08:02 AM

## 2023-05-03 ENCOUNTER — HOSPITAL ENCOUNTER (OUTPATIENT)
Dept: CT IMAGING | Age: 63
Discharge: HOME | End: 2023-05-03
Payer: COMMERCIAL

## 2023-05-03 ENCOUNTER — TELEPHONE (OUTPATIENT)
Dept: PRIMARY CARE CLINIC | Age: 63
End: 2023-05-03

## 2023-05-03 ENCOUNTER — HOSPITAL ENCOUNTER (OUTPATIENT)
Dept: GENERAL RADIOLOGY | Age: 63
Discharge: HOME | End: 2023-05-03
Payer: COMMERCIAL

## 2023-05-03 DIAGNOSIS — S22.32XK CLOSED FRACTURE OF ONE RIB OF LEFT SIDE WITH NONUNION, SUBSEQUENT ENCOUNTER: ICD-10-CM

## 2023-05-03 DIAGNOSIS — Z78.0 MENOPAUSE: ICD-10-CM

## 2023-05-03 DIAGNOSIS — M54.9 MID BACK PAIN: ICD-10-CM

## 2023-05-03 PROCEDURE — 77080 DXA BONE DENSITY AXIAL: CPT

## 2023-05-03 PROCEDURE — 71250 CT THORAX DX C-: CPT

## 2023-05-03 NOTE — TELEPHONE ENCOUNTER
Marcus Peabody from [facility] called for the patient and stated that pre authorization of  CT CHEST WO CONTRAST     Has been done.  The authorization UT--[id number]    Please review    Her call back no--[phone]    Thanks

## 2023-05-05 DIAGNOSIS — M80.00XA AGE-RELATED OSTEOPOROSIS WITH CURRENT PATHOLOGICAL FRACTURE, INITIAL ENCOUNTER: Primary | ICD-10-CM

## 2023-05-05 DIAGNOSIS — S22.000A CLOSED COMPRESSION FRACTURE OF THORACIC VERTEBRA, INITIAL ENCOUNTER (HCC): ICD-10-CM

## 2023-05-08 ENCOUNTER — TELEPHONE (OUTPATIENT)
Dept: PRIMARY CARE CLINIC | Age: 63
End: 2023-05-08

## 2023-05-08 NOTE — TELEPHONE ENCOUNTER
Called [facility] physician, Dr. Frank Nazario. Wendell Hammans for CT Chest without contrast. I spoke with physician as Dr. Severiano Wilks requested and conveyed to him that Dr. Severiano Wilks found some inconsistencies in the reading for this patient. Dr. Wendell Hammans stated he would look at the CT scan and make the necessary changes.

## 2023-05-09 ENCOUNTER — TELEPHONE (OUTPATIENT)
Dept: PULMONOLOGY | Age: 63
End: 2023-05-09

## 2023-05-09 ENCOUNTER — OFFICE VISIT (OUTPATIENT)
Dept: ORTHOPEDIC SURGERY | Age: 63
End: 2023-05-09
Payer: COMMERCIAL

## 2023-05-09 ENCOUNTER — TELEPHONE (OUTPATIENT)
Dept: ORTHOPEDIC SURGERY | Age: 63
End: 2023-05-09

## 2023-05-09 ENCOUNTER — TELEPHONE (OUTPATIENT)
Dept: PRIMARY CARE CLINIC | Age: 63
End: 2023-05-09

## 2023-05-09 VITALS — HEIGHT: 63 IN | WEIGHT: 131 LBS | BODY MASS INDEX: 23.21 KG/M2

## 2023-05-09 DIAGNOSIS — R91.8 ABNORMAL CT SCAN OF LUNG: Primary | ICD-10-CM

## 2023-05-09 DIAGNOSIS — S22.060A CLOSED WEDGE COMPRESSION FRACTURE OF T7 VERTEBRA, INITIAL ENCOUNTER (HCC): ICD-10-CM

## 2023-05-09 DIAGNOSIS — S22.050A CLOSED WEDGE COMPRESSION FRACTURE OF T6 VERTEBRA, INITIAL ENCOUNTER (HCC): Primary | ICD-10-CM

## 2023-05-09 PROCEDURE — 99204 OFFICE O/P NEW MOD 45 MIN: CPT | Performed by: PHYSICIAN ASSISTANT

## 2023-05-09 RX ORDER — DIAZEPAM 5 MG/1
TABLET ORAL
Qty: 2 TABLET | Refills: 0 | Status: SHIPPED | OUTPATIENT
Start: 2023-05-09 | End: 2023-05-09

## 2023-05-09 NOTE — PROGRESS NOTES
will get Retina specialist    Pneumonia     x 3       Past Surgical History:     Past Surgical History:   Procedure Laterality Date    CATARACT REMOVAL WITH IMPLANT  2014    both eye     GLAUCOMA SURGERY      drainage canal RT  /CEI     Current Medications:     Current Outpatient Medications:     rosuvastatin (CRESTOR) 10 MG tablet, Take 1 tablet by mouth nightly, Disp: 90 tablet, Rfl: 1    NONFORMULARY, [facility]  two capsules daily, Disp: , Rfl:     brimonidine (ALPHAGAN P) 0.15 % ophthalmic solution, Apply to eye, Disp: , Rfl:     cycloSPORINE (RESTASIS) 0.05 % ophthalmic emulsion, Place 1 drop into both eyes 2 times daily, Disp: , Rfl:     Multiple Vitamin (MULTI-VITAMIN DAILY PO), Take by mouth daily, Disp: , Rfl:     Omega-3 1000 MG CAPS, Take by mouth Two a day, Disp: , Rfl:     dorzolamide (TRUSOPT) 2 % ophthalmic solution, Place 2 drops into the left eye 2 times daily, Disp: , Rfl:     difluprednate (DUREZOL) 0.05 % EMUL, Place 1 drop into the left eye 3 drops daily, Disp: , Rfl:     bromfenac (PROLENSA) 0.07 % SOLN, 1 drop daily Right eye, Disp: , Rfl:   Allergies:  Patient has no known allergies. Social History:    reports that she has never smoked.  She has never used smokeless tobacco.  Family History:   Family History   Problem Relation Age of Onset    Other Mother 80        DM,Renal,Neuropathy    Other Father 80        PAD ,Splenectomy,Fall    Cancer Brother 46        pancreas       REVIEW OF SYSTEMS: Full ROS reviewed & scanned into chart  CONSTITUTIONAL: Denies unexplained weight loss, fevers   SKIN: Denies active skin conditions   ENT: Denies dizziness, nosebleeds  RESPIRATORY: Denies difficulty breathing--typically per pt  CARDIOVASCULAR: Denies new chest pain   NEUROLOGICAL: Denies progressive weakness  PSYCHOLOGICAL: Denies anxiety, depression   HEMATOLOGIC: Denies blood disorders, cancer  ENDOCRINE: Denies excessive thirst, heat/cold  GI: Denies current nausea, vomiting, diarrhea   : Denies

## 2023-05-09 NOTE — TELEPHONE ENCOUNTER
Patient contacted regarding Thoracic MRI WO CONTRAST approval and authorization is valid. Patient was notified and [facility] [address] [place] [address] P: [phone]    Once completed, patient was instructed on scheduling a follow up appoint to review results.  TEST RESULTS WILL NOT BE GIVEN OVER THE PHONE. AN IN-OFFICE APPOINTMENT IS REQUIRED

## 2023-05-09 NOTE — TELEPHONE ENCOUNTER
Patient was calling to see if the doctor still wanted her lung specialists, and she hasn't heard back regarding this.  Please let the patient know what her next step is         Thank you

## 2023-05-09 NOTE — TELEPHONE ENCOUNTER
Referred by: Ezra Jay MD    Reason: abn CT scan    Previous Testing: CT scan 5/3/23    New Testing Needed: can you review this patient CT scan to see if she needs to be seen sooner than 6/2/23?   Thank you    Appt Date/Time: 6/2/23

## 2023-05-10 ENCOUNTER — TELEPHONE (OUTPATIENT)
Dept: ORTHOPEDIC SURGERY | Age: 63
End: 2023-05-10

## 2023-05-10 NOTE — TELEPHONE ENCOUNTER
S/W the patient, referral has been faxed again to [address] Patient voiced understanding. She will call and schedule scan appt.

## 2023-05-10 NOTE — TELEPHONE ENCOUNTER
General Question     Subject: PATIENT REQUESTING AN ORDER FOR A THORACIC MRI TO [facility].   Patient: Denis Crawley  Contact Number: [phone]

## 2023-05-17 ENCOUNTER — OFFICE VISIT (OUTPATIENT)
Dept: ORTHOPEDIC SURGERY | Age: 63
End: 2023-05-17
Payer: COMMERCIAL

## 2023-05-17 VITALS — WEIGHT: 131 LBS | BODY MASS INDEX: 23.21 KG/M2 | HEIGHT: 63 IN

## 2023-05-17 DIAGNOSIS — S22.060D CLOSED WEDGE COMPRESSION FRACTURE OF T7 VERTEBRA WITH ROUTINE HEALING, SUBSEQUENT ENCOUNTER: ICD-10-CM

## 2023-05-17 DIAGNOSIS — S22.050D CLOSED WEDGE COMPRESSION FRACTURE OF T6 VERTEBRA WITH ROUTINE HEALING, SUBSEQUENT ENCOUNTER: Primary | ICD-10-CM

## 2023-05-17 DIAGNOSIS — M54.6 CHRONIC THORACIC BACK PAIN, UNSPECIFIED BACK PAIN LATERALITY: ICD-10-CM

## 2023-05-17 DIAGNOSIS — G89.29 CHRONIC THORACIC BACK PAIN, UNSPECIFIED BACK PAIN LATERALITY: ICD-10-CM

## 2023-05-17 PROCEDURE — 99213 OFFICE O/P EST LOW 20 MIN: CPT | Performed by: PHYSICIAN ASSISTANT

## 2023-05-17 NOTE — PROGRESS NOTES
FOLLOW UP: SPINE    5/17/2023     CHIEF COMPLAINT:  Back pain f/u MRI    HISTORY OF PRESENT ILLNESS:              The patient is a 58 y.o. female history of osteoporosis, glaucoma here to review thoracic MRI for thoracic back pain. She suffered a fall on ice February 2022 and was subsequently diagnosed with a left sixth rib fracture. She states since December 2022 she has experienced episodic burning midthoracic pain and stiffness with tightness. Her symptoms are increased with any lifting, activity or prolonged sitting. She has stiffness when first getting out of bed. She notices increased pain when running. She had a goal of trying to run 1 mile with her son--we did discuss activity modification last visit--she has since discontinued running but is being active walking. She reports relief with resting and stretching. Conservative care includes Aleve, Tylenol. She had a recent DEXA scan indicating osteoporosis and is pending endocrinology evaluation. She also had a CT of the chest showing T6 and T7 compression fractures. She currently denies any upper or lower extremity radiating pain. She denies any radiating chest wall pain. She denies any numbness tingling or extremity weakness. No fine motor difficulty gait instability. No recent bowel or bladder dysfunction or saddle anesthesia. No recent injury or trauma. No history of malignancy. No recent fevers chills or infections. The pain assessment was noted & reviewed in the medical record today.      Current/Past Treatment:   Physical Therapy:   Chiropractic:     Injection:     Medications:            NSAIDS: Aleve            Muscle relaxer:              Steriods:              Neuropathic medications:              Opioids:            Other: Tylenol  Surgery/Consult:    Past Medical History: Medical history form was reviewed today & scanned into the media tab  Past Medical History:   Diagnosis Date    Macular disorder      RT eye [facility] she

## 2023-05-22 ENCOUNTER — HOSPITAL ENCOUNTER (OUTPATIENT)
Dept: PHYSICAL THERAPY | Age: 63
Setting detail: THERAPIES SERIES
Discharge: HOME | End: 2023-05-22
Payer: COMMERCIAL

## 2023-05-22 PROCEDURE — 97161 PT EVAL LOW COMPLEX 20 MIN: CPT

## 2023-05-22 PROCEDURE — 97110 THERAPEUTIC EXERCISES: CPT

## 2023-05-22 NOTE — THERAPY EVALUATION
The [address] [place] [address]  Phone [phone]  Fax [phone]      Physical Therapy Certification    Dear Dolores Clark,*,    We had the pleasure of evaluating the following patient for physical therapy services at [address] [place] [address]. A summary of our findings can be found in the initial assessment below. This includes our plan of care. If you have any questions or concerns regarding these findings, please do not hesitate to contact me at the office phone number checked above. Thank you for the referral.       Physician Signature:_______________________________Date:__________________  By signing above (or electronic signature), therapists plan is approved by physician      Patient: Karla Mccray   : 1960   MRN: [id number]  Referring Physician: Dolores Clark      Evaluation Date: 2023      Medical Diagnosis Information:  Diagnosis: S22.050D (ICD-10-CM) - Closed wedge compression fracture of T6 vertebra with routine healing, subsequent encounter; S22.060D (ICD-10-CM) - Closed wedge compression fracture of T7 vertebra with routine healing, subsequent encounter; M54.6, G89.29 (ICD-10-CM) - Chronic thoracic back pain, unspecified back pain laterality   Treatment Diagnosis: M54.6 Thoracic Back Pain                                         Insurance information: PT Insurance Information: [place] [employer]BS     Precautions/ Contra-indications: Hx of thoracic compression Fx      Latex Allergy:  [x]NO      []YES  Preferred Language for Healthcare:   [x]English       []other:    SUBJECTIVE: Patient is a 41-year-old female who presents with complaints of mid- to low-thoracic back pain following a fall on ice in 2022. Thoracic x-rays showed compression fractures of T6 and T7 vertebrae, as well as a fracture to the sixth rib.  Patient notes that recent CT scan and MRI reports have

## 2023-05-22 NOTE — FLOWSHEET NOTE
[address] [facility], [facility] [address] [place] [address], [address] [place] [address]  Phone: [phone]   Fax:     [phone]    Physical Therapy Daily Treatment Note  Date:  2023    Patient Name:  Humberto Burns    :  1960  MRN: [id number]  Restrictions/Precautions:    Medical/Treatment Diagnosis Information:  Diagnosis: S22.050D (ICD-10-CM) - Closed wedge compression fracture of T6 vertebra with routine healing, subsequent encounter; S22.060D (ICD-10-CM) - Closed wedge compression fracture of T7 vertebra with routine healing, subsequent encounter; M54.6, G89.29 (ICD-10-CM) - Chronic thoracic back pain, unspecified back pain laterality  Treatment Diagnosis: M54.6 Thoracic Back Pain  Insurance/Certification information:  PT Insurance Information: [facility]  Physician Information:  Herson Akhtar,*  Has the plan of care been signed (Y/N):        []  Yes  [x]  No     Date of Patient follow up with Physician:       Is this a Progress Report:     []  Yes  [x]  No        If Yes:  Date Range for reporting period:  Beginning 23  Ending 23    Progress report will be due (10 Rx or 30 days whichever is less):       Recertification will be due (POC Duration  / 90 days whichever is less):          Visit # Insurance Allowable Auth Required   1   TBD [x]  Yes []  No        Functional Scale: [place] = 12 % disability    Date assessed:  23     Latex Allergy:  [x]NO      []YES  Preferred Language for Healthcare:   [x]English       []other:      Pain level:  0-3/10     SUBJECTIVE:  See eval      OBJECTIVE:   ROM     Comments   Trunk flexion WNL    Trunk extension WNL    Trunk R sidebend WNL    Trunk L sidebend WNL    Trunk R rotation WNL    Trunk L rotation WNL    HS flexibility WNL                       Strength   Left Right Comments   Hip flexion(L2) 4+ 5    Knee extension(L3) 5 5    Knee flexion(S1-2) 5 5    Ankle

## 2023-05-25 ENCOUNTER — TELEPHONE (OUTPATIENT)
Dept: PRIMARY CARE CLINIC | Age: 63
End: 2023-05-25

## 2023-05-25 DIAGNOSIS — M54.6 MIDLINE THORACIC BACK PAIN, UNSPECIFIED CHRONICITY: Primary | ICD-10-CM

## 2023-05-25 NOTE — TELEPHONE ENCOUNTER
Pt states that Dr. Brenden Zhu refer her to orthopaedics and orthopaedics referred her to PT but [facility] is [facility] every visit, she wants to know if Dr. Brenden Zhu can refer her to [facility] due to the cost

## 2023-06-02 ENCOUNTER — OFFICE VISIT (OUTPATIENT)
Dept: PULMONOLOGY | Age: 63
End: 2023-06-02
Payer: COMMERCIAL

## 2023-06-02 VITALS
BODY MASS INDEX: 23.18 KG/M2 | SYSTOLIC BLOOD PRESSURE: 118 MMHG | HEIGHT: 63 IN | RESPIRATION RATE: 16 BRPM | WEIGHT: 130.8 LBS | HEART RATE: 71 BPM | OXYGEN SATURATION: 98 % | DIASTOLIC BLOOD PRESSURE: 74 MMHG

## 2023-06-02 DIAGNOSIS — R93.89 ABNORMAL CT OF THE CHEST: Primary | ICD-10-CM

## 2023-06-02 PROCEDURE — 99204 OFFICE O/P NEW MOD 45 MIN: CPT | Performed by: INTERNAL MEDICINE

## 2023-06-02 NOTE — PROGRESS NOTES
[facility]    Outpatient Initial Note    Subjective:   CHIEF COMPLAINT / HPI:     The patient is 58 y.o. female who presents today for a new patient visit for abnormal CT Chest. Tyrel Andres fell on ice in Feb 2022 and sustained a left rib fracture. Recently she was getting mid back pain with running and making it hard for her to take a deep breath. Rib cage on left looked different. CT Chest was ordered for evaluation and showed:    IMPRESSION:     1. Mild compression of the C6 and C7 vertebral bodies. Correlation with clinical history and findings is necessary. As indicated, this could be further evaluated with MRI of the thoracic spine to evaluate for acute versus chronic finding. .     2. Tree-in-bud nodularity posterior right lower lobe, suggesting atypical infectious process. 3. Small scattered pulmonary nodules. Following the Fleischner Society 2017 guidelines for multiple solid nodules <6 mm, if patient is low risk no routine follow-up is suggested. If patient is high risk, then optional CT at 12 months is suggested. 4. Several rib fracture. On my conversation she states she gets dyspneic with very strenuous activity such as going up a steep hill. She denies fever, chills, night sweats, weight loss, anorexia, malaise, cough, sputum production, wheezing or chest tightness.     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

## 2023-07-07 ENCOUNTER — TELEPHONE (OUTPATIENT)
Dept: PRIMARY CARE CLINIC | Age: 63
End: 2023-07-07

## 2023-07-07 DIAGNOSIS — E78.9 LIPID DISORDER: Primary | ICD-10-CM

## 2023-07-07 NOTE — TELEPHONE ENCOUNTER
----- Message from Eleno Retana sent at 7/7/2023  3:36 PM EDT -----  Subject: Referral Request    Reason for referral request? Pt needs bloodwork done for rosuvastatin   ,pt's last bloodwork was 4- ,so would like to come in to get drawn   before her preop appt on 7- if needed ,please call pt when orders   are in so she can get drawn  Provider patient wants to be referred to(if known): Margarita Raymundo    Provider Phone Number(if known):     Additional Information for Provider?   ---------------------------------------------------------------------------  --------------  Mynor Ellston Nathalia    [phone]; OK to leave message on voicemail  ---------------------------------------------------------------------------  --------------

## 2023-07-11 DIAGNOSIS — E78.9 LIPID DISORDER: ICD-10-CM

## 2023-07-11 LAB
ALBUMIN SERPL-MCNC: 4.5 G/DL (ref 3.4–5)
ALP SERPL-CCNC: 62 U/L (ref 40–129)
ALT SERPL-CCNC: 16 U/L (ref 10–40)
AST SERPL-CCNC: 24 U/L (ref 15–37)
BILIRUB DIRECT SERPL-MCNC: <0.2 MG/DL (ref 0–0.3)
BILIRUB INDIRECT SERPL-MCNC: NORMAL MG/DL (ref 0–1)
BILIRUB SERPL-MCNC: 0.6 MG/DL (ref 0–1)
CHOLEST SERPL-MCNC: 167 MG/DL (ref 0–199)
HDLC SERPL-MCNC: 95 MG/DL (ref 40–60)
LDLC SERPL CALC-MCNC: 59 MG/DL
PROT SERPL-MCNC: 6.9 G/DL (ref 6.4–8.2)
TRIGL SERPL-MCNC: 67 MG/DL (ref 0–150)
VLDLC SERPL CALC-MCNC: 13 MG/DL

## 2023-07-17 ENCOUNTER — OFFICE VISIT (OUTPATIENT)
Dept: PRIMARY CARE CLINIC | Age: 63
End: 2023-07-17

## 2023-07-17 VITALS
OXYGEN SATURATION: 95 % | BODY MASS INDEX: 23.5 KG/M2 | TEMPERATURE: 97.2 F | SYSTOLIC BLOOD PRESSURE: 124 MMHG | HEART RATE: 67 BPM | DIASTOLIC BLOOD PRESSURE: 78 MMHG | WEIGHT: 132.6 LBS | HEIGHT: 63 IN

## 2023-07-17 DIAGNOSIS — H18.821 CORNEAL DISORDER DUE TO CONTACT LENS, RIGHT EYE: ICD-10-CM

## 2023-07-17 DIAGNOSIS — E78.9 LIPID DISORDER: ICD-10-CM

## 2023-07-17 DIAGNOSIS — Z01.818 PREOPERATIVE CLEARANCE: Primary | ICD-10-CM

## 2023-07-17 ASSESSMENT — ENCOUNTER SYMPTOMS
BACK PAIN: 1
ALLERGIC/IMMUNOLOGIC NEGATIVE: 1
GASTROINTESTINAL NEGATIVE: 1
ABDOMINAL PAIN: 0

## 2023-07-17 NOTE — PROGRESS NOTES
SUBJECTIVE:  Patient ID: Jennifer Perez is a 61 y.o. female. Chief Complaint:  Chief Complaint   Patient presents with    Mozelle Gaucher, MD 8/1/23 [facility] OD  [phone]       HPI  61year old Female  [facility] care  Dr Liu Saeed swollen  Cornea transplant  Vision RT eye worse    Past Medical History:   Diagnosis Date    Macular disorder      RT eye [facility] she will get Retina specialist    Pneumonia     x 3      Past Surgical History:   Procedure Laterality Date    CATARACT REMOVAL WITH IMPLANT  2014    both eye     GLAUCOMA SURGERY      drainage canal RT  /CEI     No Known Allergies    Family History   Problem Relation Age of Onset    Other Mother 80        DM,Renal,Neuropathy    Other Father 80        PAD ,Splenectomy,Fall    Cancer Brother 46        pancreas     Social History     Social History Narrative             FT [employer] ,[employer]    Daughter 36+G daughter 3 y & baby girl 3year old    Son 32 y o moved to [place]  + in program PhD       Patient Active Problem List   Diagnosis    Open-angle glaucoma    Menopause    Colon polyp     Current Outpatient Medications   Medication Sig Dispense Refill    rosuvastatin (CRESTOR) 10 MG tablet Take 1 tablet by mouth nightly 90 tablet 1    NONFORMULARY [facility]  two capsules daily      brimonidine (ALPHAGAN P) 0.15 % ophthalmic solution Apply to eye      cycloSPORINE (RESTASIS) 0.05 % ophthalmic emulsion Place 1 drop into both eyes 2 times daily      Multiple Vitamin (MULTI-VITAMIN DAILY PO) Take by mouth daily      Omega-3 1000 MG CAPS Take by mouth Two a day      dorzolamide (TRUSOPT) 2 % ophthalmic solution Place 2 drops into the left eye 2 times daily      difluprednate (DUREZOL) 0.05 % EMUL Place 1 drop into the left eye 3 drops daily      bromfenac (PROLENSA) 0.07 % SOLN 1 drop daily Right eye       No current facility-administered medications for this visit.      Lab Results   Component Value Date

## 2023-07-27 ENCOUNTER — OFFICE VISIT (OUTPATIENT)
Dept: ENDOCRINOLOGY | Age: 63
End: 2023-07-27
Payer: COMMERCIAL

## 2023-07-27 VITALS
HEART RATE: 88 BPM | SYSTOLIC BLOOD PRESSURE: 119 MMHG | BODY MASS INDEX: 23.46 KG/M2 | HEIGHT: 63 IN | WEIGHT: 132.4 LBS | DIASTOLIC BLOOD PRESSURE: 70 MMHG | OXYGEN SATURATION: 99 %

## 2023-07-27 DIAGNOSIS — S22.000S THORACIC COMPRESSION FRACTURE, SEQUELA: ICD-10-CM

## 2023-07-27 DIAGNOSIS — M81.0 POST-MENOPAUSAL OSTEOPOROSIS: Primary | ICD-10-CM

## 2023-07-27 DIAGNOSIS — E55.9 VITAMIN D DEFICIENCY: ICD-10-CM

## 2023-07-27 PROCEDURE — 99204 OFFICE O/P NEW MOD 45 MIN: CPT | Performed by: INTERNAL MEDICINE

## 2023-07-27 NOTE — PATIENT INSTRUCTIONS
I will recommend daily aerobic weight bearing exercise as tolerated. As a general rule, avoid any activities that require forward bending of the spine. Use the knees for any lifting. Beware to keep back straight during cough or sneeze.       FALL PREVENTION INSTRUCTIONS GIVEN:  -Avoid throw rugs, and floor clutter (especially around bed)  -Use nightlights and visual aids when getting up at night   -Use non-skid surface for rugs in bathrooms and near sinks  -Use non-skid pads for bathtub/shower

## 2023-07-27 NOTE — PROGRESS NOTES
Patient ID: Franny Edwards is a 61 y.o. female     Chief Complaint: osteoporosis, thoracic compression fracture      HPI:   Franny Edwards is here for initial evaluation of osteoporosis. DXA scan May 2023:   T scores of -3.5 at LS, -2.5 at left hip, -2.7 at left femoral neck, -2.5 at right hip and -3.0 at right femoral neck. Previous fractures: Had thoracic compression fractures in Feb 2022. Also broke rib    No h/o kidney stones or radiation     Family history of osteoporosis or hip fracture: Not known   Age of menopause was 48 years. There is no history of recent jaw surgery or planned jaw surgery. Maintains follow-up with the dentist on regular basis. Total daily calcium intake is approximately 700-800 mg/day. Milk, ice cream upsets stomach. Yogurt three days a week. Cheese three times per week. Supplemental Ca 0 mg/day   MVT daily   Supplemental vitamin D: none . Number of falls during last year: None   Patient does not have difficulty with walking or balance.     Does not smoke or drink     The following portions of the patient's history were reviewed and updated as appropriate:      Family History   Problem Relation Age of Onset    Other Mother 80        DM,Renal,Neuropathy    Other Father 80        PAD ,Splenectomy,Fall    Cancer Brother 46        pancreas          Social History     Socioeconomic History    Marital status:      Spouse name: Enrike Arellano    Number of children: 2    Years of education: Not on file    Highest education level: Not on file   Occupational History     Comment: [employer]   Tobacco Use    Smoking status: Never    Smokeless tobacco: Never   Substance and Sexual Activity    Alcohol use: Not on file    Drug use: Not on file    Sexual activity: Yes     Partners: Male     Comment: M 2 kids   Other Topics Concern    Not on file   Social History Narrative              Tommie Gomez    Daughter 36+G daughter 3 y & baby girl 3year old

## 2023-10-30 DIAGNOSIS — E78.9 LIPID DISORDER: ICD-10-CM

## 2023-10-30 RX ORDER — ROSUVASTATIN CALCIUM 10 MG/1
10 TABLET, COATED ORAL NIGHTLY
Qty: 90 TABLET | Refills: 1 | Status: SHIPPED | OUTPATIENT
Start: 2023-10-30

## 2023-10-30 NOTE — TELEPHONE ENCOUNTER
Medication:   Requested Prescriptions     Pending Prescriptions Disp Refills    rosuvastatin (CRESTOR) 10 MG tablet 90 tablet 1     Sig: Take 1 tablet by mouth nightly     Last Filled:  5/1/2023    Last appt: 7/17/2023   Next appt: Visit date not found    Last Lipid:   Lab Results   Component Value Date/Time    CHOL 167 07/11/2023 08:07 AM    TRIG 67 07/11/2023 08:07 AM    HDL 95 07/11/2023 08:07 AM    HDL 67 10/24/2011 12:00 AM    LDLCALC 59 07/11/2023 08:07 AM

## 2023-11-24 NOTE — TELEPHONE ENCOUNTER
Patient calling back and was made aware that urine testing was ordered for her. Symptoms:  Bladder pressure  Burning with urination  Cloudy urine       Denies fever chills, or gross blood at this time     Patient is wondering if there is anything that can be sent to her pharmacy due to it being the weekend. Pharmacy:    [facility] [address], [address] [facility], [address] [place] [phone]     Patient will go to have urine testing done.      CB: [phone] Pt was wondering if there was anything you could give her anything for the pain from her broken rib she saw you last Wednesday     [facility] [phone]

## 2024-01-19 ENCOUNTER — OFFICE VISIT (OUTPATIENT)
Dept: PRIMARY CARE CLINIC | Age: 64
End: 2024-01-19
Payer: COMMERCIAL

## 2024-01-19 VITALS
HEIGHT: 63 IN | SYSTOLIC BLOOD PRESSURE: 124 MMHG | DIASTOLIC BLOOD PRESSURE: 76 MMHG | OXYGEN SATURATION: 98 % | WEIGHT: 128.6 LBS | HEART RATE: 72 BPM | BODY MASS INDEX: 22.79 KG/M2 | TEMPERATURE: 98 F

## 2024-01-19 DIAGNOSIS — M81.0 POST-MENOPAUSAL OSTEOPOROSIS: ICD-10-CM

## 2024-01-19 DIAGNOSIS — Z00.00 ROUTINE PHYSICAL EXAMINATION: Primary | ICD-10-CM

## 2024-01-19 DIAGNOSIS — Z13.1 DIABETES MELLITUS SCREENING: ICD-10-CM

## 2024-01-19 DIAGNOSIS — H40.1190 PRIMARY OPEN ANGLE GLAUCOMA, UNSPECIFIED GLAUCOMA STAGE, UNSPECIFIED LATERALITY: ICD-10-CM

## 2024-01-19 DIAGNOSIS — Z13.220 LIPID SCREENING: ICD-10-CM

## 2024-01-19 PROCEDURE — 99396 PREV VISIT EST AGE 40-64: CPT | Performed by: FAMILY MEDICINE

## 2024-01-19 RX ORDER — LATANOPROST 50 UG/ML
SOLUTION/ DROPS OPHTHALMIC
COMMUNITY
Start: 2024-01-18

## 2024-01-19 SDOH — ECONOMIC STABILITY: INCOME INSECURITY: HOW HARD IS IT FOR YOU TO PAY FOR THE VERY BASICS LIKE FOOD, HOUSING, MEDICAL CARE, AND HEATING?: NOT HARD AT ALL

## 2024-01-19 SDOH — ECONOMIC STABILITY: FOOD INSECURITY: WITHIN THE PAST 12 MONTHS, THE FOOD YOU BOUGHT JUST DIDN'T LAST AND YOU DIDN'T HAVE MONEY TO GET MORE.: NEVER TRUE

## 2024-01-19 SDOH — ECONOMIC STABILITY: FOOD INSECURITY: WITHIN THE PAST 12 MONTHS, YOU WORRIED THAT YOUR FOOD WOULD RUN OUT BEFORE YOU GOT MONEY TO BUY MORE.: NEVER TRUE

## 2024-01-19 ASSESSMENT — ENCOUNTER SYMPTOMS
WHEEZING: 0
ALLERGIC/IMMUNOLOGIC NEGATIVE: 1
SHORTNESS OF BREATH: 0
RESPIRATORY NEGATIVE: 1
APNEA: 0
BACK PAIN: 0
CHEST TIGHTNESS: 0
EYES NEGATIVE: 1

## 2024-01-19 ASSESSMENT — PATIENT HEALTH QUESTIONNAIRE - PHQ9
SUM OF ALL RESPONSES TO PHQ QUESTIONS 1-9: 0
SUM OF ALL RESPONSES TO PHQ QUESTIONS 1-9: 0
1. LITTLE INTEREST OR PLEASURE IN DOING THINGS: 0
SUM OF ALL RESPONSES TO PHQ QUESTIONS 1-9: 0
SUM OF ALL RESPONSES TO PHQ9 QUESTIONS 1 & 2: 0
2. FEELING DOWN, DEPRESSED OR HOPELESS: 0
SUM OF ALL RESPONSES TO PHQ QUESTIONS 1-9: 0

## 2024-01-19 NOTE — PROGRESS NOTES
SUBJECTIVE:  Patient ID: Birgit Polanco is a 63 y.o. female.  Chief Complaint:  Chief Complaint   Patient presents with    Annual Exam       HPI  63 year old Female  Annual  Recent Cornea transplant Rt eye  Dx Glaucoma Followed by [facility] Dx Glaucoma   FHx Mother MD  Dx Osteoporosis Endocrinology Dr Ruby @ [facility]  get monthly injection    Past Medical History:   Diagnosis Date    Macular disorder      RT eye [facility] she will get Retina specialist    Pneumonia     x 3      Past Surgical History:   Procedure Laterality Date    CATARACT REMOVAL WITH IMPLANT  2014    both eye     CORNEAL TRANSPLANT Right     CEI year 2023    GLAUCOMA SURGERY      drainage canal RT  /CEI     No Known Allergies    Family History   Problem Relation Age of Onset    Other Mother 81        DM,Renal,Neuropathy    Other Father 81        PAD ,Splenectomy,Fall    Cancer Brother 51        pancreas     Social History     Social History Narrative             FT [employer] ,[place]    Daughter 36+G daughter 5 y & girl 1 year old    Son 27 y o moved to [place]  + in program PhD       Patient Active Problem List   Diagnosis    Open-angle glaucoma    Menopause    Colon polyp    Post-menopausal osteoporosis    Vitamin D deficiency    Thoracic compression fracture, sequela     Current Outpatient Medications   Medication Sig Dispense Refill    latanoprost (XALATAN) 0.005 % ophthalmic solution       rosuvastatin (CRESTOR) 10 MG tablet Take 1 tablet by mouth nightly 90 tablet 1    NONFORMULARY [facility]  two capsules daily      brimonidine (ALPHAGAN P) 0.15 % ophthalmic solution Place 1 drop into the left eye 3 times daily      cycloSPORINE (RESTASIS) 0.05 % ophthalmic emulsion Place 1 drop into both eyes 2 times daily      Multiple Vitamin (MULTI-VITAMIN DAILY PO) Take by mouth daily      Omega-3 1000 MG CAPS Take by mouth Two a day      dorzolamide (TRUSOPT) 2 % ophthalmic solution Place 2 drops into the left eye 2 times daily

## 2024-02-16 LAB
A/G RATIO: 1.6 (ref 1–2.1)
ALBUMIN SERPL-MCNC: 4.4 G/DL (ref 3.5–5)
ALP BLD-CCNC: 65 U/L (ref 33–140)
ALT SERPL-CCNC: 18 U/L (ref 0–40)
ANION GAP SERPL CALCULATED.3IONS-SCNC: 8 MMOL/L (ref 5–13)
AST SERPL-CCNC: 24 U/L (ref 0–30)
BASOPHILS ABSOLUTE: 0 10*3/UL (ref 0–0.2)
BASOPHILS RELATIVE PERCENT: 0.5 %
BILIRUB SERPL-MCNC: 0.8 MG/DL (ref 0.2–1.2)
BUN / CREAT RATIO: 19
BUN BLDV-MCNC: 14 MG/DL (ref 7–25)
CALCIUM SERPL-MCNC: 9.1 MG/DL (ref 8.5–10.5)
CHLORIDE BLD-SCNC: 108 MMOL/L (ref 98–110)
CHOLESTEROL, TOTAL: 170 MG/DL (ref 125–199)
CO2: 26 MMOL/L (ref 22–29)
CREAT SERPL-MCNC: 0.75 MG/DL (ref 0.5–1.2)
EGFR (CKD-EPI): 89 SEE NOTE
EOSINOPHILS ABSOLUTE: 0 10*3/UL (ref 0–0.5)
EOSINOPHILS RELATIVE PERCENT: 0.2 %
ESTIMATED AVERAGE GLUCOSE: 108 MG/DL (ref 68–114)
GLOBULIN: 2.8 G/DL (ref 2–3.7)
GLUCOSE TOLERANCE TEST FASTING: 109 MG/DL (ref 71–99)
HBA1C MFR BLD: 5.4 % (ref 4–5.6)
HCT VFR BLD CALC: 50 % (ref 35–46)
HDLC SERPL-MCNC: 88 MG/DL (ref 40–180)
HEMOGLOBIN: 15.6 G/DL (ref 11.7–15.5)
IMMATURE GRANULOCYTES: 0 10*3/UL (ref 0–0.1)
IMMATURE GRANULOCYTES: 0.2 % (ref 0–2)
LDL CHOLESTEROL CALCULATED: 69 MG/DL (ref 0–100)
LYMPHOCYTES ABSOLUTE: 1.7 10*3/UL (ref 0.8–3.9)
LYMPHOCYTES RELATIVE PERCENT: 27 %
MCH RBC QN AUTO: 31.5 PG (ref 27–33)
MCHC RBC AUTO-ENTMCNC: 31.2 G/DL (ref 30–36)
MCV RBC AUTO: 100.8 FL (ref 80–100)
MONOCYTES ABSOLUTE: 0.5 10*3/UL (ref 0.2–0.9)
MONOCYTES RELATIVE PERCENT: 7.5 %
NEUTROPHILS ABSOLUTE: 4 10*3/UL (ref 1.5–7.8)
NONHDLC SERPL-MCNC: 82 MG/DL (ref 0–129)
PDW BLD-RTO: 12.3 % (ref 11–15)
PLATELET # BLD: 215 10*3/UL (ref 140–400)
PMV BLD AUTO: 9.3 FL (ref 9–13)
POTASSIUM SERPL-SCNC: 4.1 MMOL/L (ref 3.5–5.1)
RBC # BLD: 4.96 10*6/UL (ref 3.8–5.1)
SEGMENTED NEUTROPHILS RELATIVE PERCENT: 64.6 %
SODIUM BLD-SCNC: 142 MMOL/L (ref 135–146)
TOTAL PROTEIN: 7.2 G/DL (ref 6–8)
TRIGL SERPL-MCNC: 67 MG/DL (ref 0–149)
WBC # BLD: 6.1 10*3/UL (ref 3.8–10.8)

## 2024-02-19 NOTE — PROGRESS NOTES
Subjective:      Patient ID: Orville Herrera is a 62 y.o. female. HPI  Ears stopped up yest , then got nauseated, has vomited several times. No ST., no HA. Has poor balance,  Vertigo. Took some Advil Congestion Relief yest, the pressure relieved, hearing OK, and no ear pain    Review of Systems    Objective:   Physical Exam   Constitutional: She appears well-developed and well-nourished. HENT:   Right Ear: Tympanic membrane and external ear normal.   Left Ear: Tympanic membrane and external ear normal.   Mouth/Throat: Uvula is midline, oropharynx is clear and moist and mucous membranes are normal.   Dunn to the left   Neck: Normal range of motion. Neck supple. No thyromegaly present. Cardiovascular: Normal rate, regular rhythm and normal heart sounds. No murmur heard. Pulmonary/Chest: Effort normal and breath sounds normal.   Lymphadenopathy:     She has no cervical adenopathy. Epley maneuver performed starting with the left ear down. The patient was still very nauseated afterwards but stated that her vertigo had decreased somewhat. I wanted to give her a shot of promethazine, but given her history of glaucoma in both eyes I was hesitant to do that without the okay from her ophthalmologist. We contacted [address] [place] and eventually got approval to give the promethazine. Assessment:    vestibulitis      Spent 25+ min with the patient, > than 50% of the time with evaluation/counselling/coordination of care     Plan:      Phenergan 25 mg IM given in the office. Patient waited 10-15 minutes and there was no reaction so she went home  Zofran 4 mg one by mouth every 6 hours when necessary nausea dispense 6.   Post Epley instructions given never

## 2024-04-30 DIAGNOSIS — E78.9 LIPID DISORDER: ICD-10-CM

## 2024-05-01 RX ORDER — ROSUVASTATIN CALCIUM 10 MG/1
10 TABLET, COATED ORAL NIGHTLY
Qty: 90 TABLET | Refills: 1 | Status: SHIPPED | OUTPATIENT
Start: 2024-05-01

## 2024-05-01 NOTE — TELEPHONE ENCOUNTER
Medication:   Requested Prescriptions     Pending Prescriptions Disp Refills    rosuvastatin (CRESTOR) 10 MG tablet 90 tablet 1     Sig: Take 1 tablet by mouth nightly     Last Filled:  10.30.23    Last appt: 1/19/2024   Next appt: Visit date not found    Last Lipid:   Lab Results   Component Value Date/Time    CHOL 170 02/16/2024 08:10 AM    TRIG 67 02/16/2024 08:10 AM    HDL 88 02/16/2024 08:10 AM    HDL 67 10/24/2011 12:00 AM

## 2024-07-17 ENCOUNTER — OFFICE VISIT (OUTPATIENT)
Dept: PRIMARY CARE CLINIC | Age: 64
End: 2024-07-17
Payer: COMMERCIAL

## 2024-07-17 VITALS
SYSTOLIC BLOOD PRESSURE: 102 MMHG | TEMPERATURE: 97.2 F | BODY MASS INDEX: 22.86 KG/M2 | DIASTOLIC BLOOD PRESSURE: 86 MMHG | OXYGEN SATURATION: 99 % | HEIGHT: 63 IN | WEIGHT: 129 LBS | RESPIRATION RATE: 16 BRPM | HEART RATE: 76 BPM

## 2024-07-17 DIAGNOSIS — Z94.7 HISTORY OF CORNEA TRANSPLANT: ICD-10-CM

## 2024-07-17 DIAGNOSIS — E78.2 MIXED HYPERLIPIDEMIA: Primary | ICD-10-CM

## 2024-07-17 DIAGNOSIS — H04.123 DRY EYES, BILATERAL: ICD-10-CM

## 2024-07-17 DIAGNOSIS — E55.9 VITAMIN D DEFICIENCY: ICD-10-CM

## 2024-07-17 PROCEDURE — 99213 OFFICE O/P EST LOW 20 MIN: CPT | Performed by: FAMILY MEDICINE

## 2024-07-17 SDOH — ECONOMIC STABILITY: FOOD INSECURITY: WITHIN THE PAST 12 MONTHS, YOU WORRIED THAT YOUR FOOD WOULD RUN OUT BEFORE YOU GOT MONEY TO BUY MORE.: NEVER TRUE

## 2024-07-17 SDOH — ECONOMIC STABILITY: FOOD INSECURITY: WITHIN THE PAST 12 MONTHS, THE FOOD YOU BOUGHT JUST DIDN'T LAST AND YOU DIDN'T HAVE MONEY TO GET MORE.: NEVER TRUE

## 2024-07-17 SDOH — ECONOMIC STABILITY: INCOME INSECURITY: HOW HARD IS IT FOR YOU TO PAY FOR THE VERY BASICS LIKE FOOD, HOUSING, MEDICAL CARE, AND HEATING?: NOT HARD AT ALL

## 2024-07-17 ASSESSMENT — PATIENT HEALTH QUESTIONNAIRE - PHQ9
5. POOR APPETITE OR OVEREATING: NOT AT ALL
8. MOVING OR SPEAKING SO SLOWLY THAT OTHER PEOPLE COULD HAVE NOTICED. OR THE OPPOSITE, BEING SO FIGETY OR RESTLESS THAT YOU HAVE BEEN MOVING AROUND A LOT MORE THAN USUAL: NOT AT ALL
SUM OF ALL RESPONSES TO PHQ QUESTIONS 1-9: 0
3. TROUBLE FALLING OR STAYING ASLEEP: NOT AT ALL
1. LITTLE INTEREST OR PLEASURE IN DOING THINGS: NOT AT ALL
SUM OF ALL RESPONSES TO PHQ9 QUESTIONS 1 & 2: 0
4. FEELING TIRED OR HAVING LITTLE ENERGY: NOT AT ALL
SUM OF ALL RESPONSES TO PHQ QUESTIONS 1-9: 0
2. FEELING DOWN, DEPRESSED OR HOPELESS: NOT AT ALL
SUM OF ALL RESPONSES TO PHQ QUESTIONS 1-9: 0
7. TROUBLE CONCENTRATING ON THINGS, SUCH AS READING THE NEWSPAPER OR WATCHING TELEVISION: NOT AT ALL
6. FEELING BAD ABOUT YOURSELF - OR THAT YOU ARE A FAILURE OR HAVE LET YOURSELF OR YOUR FAMILY DOWN: NOT AT ALL
SUM OF ALL RESPONSES TO PHQ QUESTIONS 1-9: 0
10. IF YOU CHECKED OFF ANY PROBLEMS, HOW DIFFICULT HAVE THESE PROBLEMS MADE IT FOR YOU TO DO YOUR WORK, TAKE CARE OF THINGS AT HOME, OR GET ALONG WITH OTHER PEOPLE: NOT DIFFICULT AT ALL
9. THOUGHTS THAT YOU WOULD BE BETTER OFF DEAD, OR OF HURTING YOURSELF: NOT AT ALL

## 2024-07-17 ASSESSMENT — ENCOUNTER SYMPTOMS
SHORTNESS OF BREATH: 0
EYE ITCHING: 0
TROUBLE SWALLOWING: 0
EYE DISCHARGE: 0
VOMITING: 0
SORE THROAT: 0
COUGH: 0
DIARRHEA: 0
ABDOMINAL PAIN: 0
NAUSEA: 0

## 2024-07-17 ASSESSMENT — ANXIETY QUESTIONNAIRES
3. WORRYING TOO MUCH ABOUT DIFFERENT THINGS: NOT AT ALL
IF YOU CHECKED OFF ANY PROBLEMS ON THIS QUESTIONNAIRE, HOW DIFFICULT HAVE THESE PROBLEMS MADE IT FOR YOU TO DO YOUR WORK, TAKE CARE OF THINGS AT HOME, OR GET ALONG WITH OTHER PEOPLE: NOT DIFFICULT AT ALL
4. TROUBLE RELAXING: NOT AT ALL
GAD7 TOTAL SCORE: 0
6. BECOMING EASILY ANNOYED OR IRRITABLE: NOT AT ALL
7. FEELING AFRAID AS IF SOMETHING AWFUL MIGHT HAPPEN: NOT AT ALL
5. BEING SO RESTLESS THAT IT IS HARD TO SIT STILL: NOT AT ALL
2. NOT BEING ABLE TO STOP OR CONTROL WORRYING: NOT AT ALL
1. FEELING NERVOUS, ANXIOUS, OR ON EDGE: NOT AT ALL

## 2024-07-17 NOTE — PROGRESS NOTES
Birgit Polanco (:  1960) is a 64 y.o. female,Established patient, here for evaluation of the following chief complaint(s):  Follow-up and Cholesterol Problem      ASSESSMENT/PLAN:  1. Mixed hyperlipidemia  Assessment & Plan:     Orders:  -     LIPID PANEL; Future  2. Vitamin D deficiency  3. History of cornea transplant  Assessment & Plan:     4. Dry eyes, bilateral      Patient to get labs done at [facility] with her other labs from endocrinologist at [facility]    Return in about 7 months (around 2025), or if symptoms worsen or fail to improve, for Physical.    SUBJECTIVE/OBJECTIVE:  HPI      Hyperlipidemia  - on Crestor   - tolerating this well    Dry Eyes  Glaucoma  History of Cornea Transplant  - takes Omega 3  - eye drops from Eye specialist    Vit D   - on MV  - normal Vit D in 2023        Review of Systems   Constitutional:  Negative for appetite change, chills, fatigue and fever.   HENT:  Negative for congestion, ear pain, sneezing, sore throat and trouble swallowing.    Eyes:  Negative for discharge and itching.        Dry eyes   Respiratory:  Negative for cough and shortness of breath.    Cardiovascular:  Negative for chest pain and leg swelling.   Gastrointestinal:  Negative for abdominal pain, diarrhea, nausea and vomiting.   Genitourinary:  Negative for dysuria and urgency.   Musculoskeletal:  Negative for joint swelling and neck pain.   Neurological:  Negative for light-headedness and headaches.   Psychiatric/Behavioral:  Negative for dysphoric mood. The patient is not nervous/anxious.        Physical Exam  Constitutional:       General: She is not in acute distress.     Appearance: Normal appearance.   HENT:      Head: Normocephalic and atraumatic.      Nose: Nose normal. No congestion or rhinorrhea.   Eyes:      General:         Right eye: No discharge.         Left eye: No discharge.      Extraocular Movements: Extraocular movements intact.      Conjunctiva/sclera: Conjunctivae normal.

## 2024-08-11 SDOH — HEALTH STABILITY: PHYSICAL HEALTH: ON AVERAGE, HOW MANY DAYS PER WEEK DO YOU ENGAGE IN MODERATE TO STRENUOUS EXERCISE (LIKE A BRISK WALK)?: 5 DAYS

## 2024-08-11 SDOH — HEALTH STABILITY: PHYSICAL HEALTH: ON AVERAGE, HOW MANY MINUTES DO YOU ENGAGE IN EXERCISE AT THIS LEVEL?: 10 MIN

## 2024-08-12 ENCOUNTER — OFFICE VISIT (OUTPATIENT)
Dept: PRIMARY CARE CLINIC | Age: 64
End: 2024-08-12
Payer: COMMERCIAL

## 2024-08-12 VITALS
OXYGEN SATURATION: 95 % | DIASTOLIC BLOOD PRESSURE: 90 MMHG | WEIGHT: 130 LBS | BODY MASS INDEX: 23.04 KG/M2 | SYSTOLIC BLOOD PRESSURE: 136 MMHG | TEMPERATURE: 97.1 F | HEIGHT: 63 IN | HEART RATE: 97 BPM

## 2024-08-12 DIAGNOSIS — J02.9 SORE THROAT: ICD-10-CM

## 2024-08-12 DIAGNOSIS — K13.79 MOUTH SORES: ICD-10-CM

## 2024-08-12 DIAGNOSIS — B37.0 ORAL THRUSH: Primary | ICD-10-CM

## 2024-08-12 LAB — S PYO AG THROAT QL: NORMAL

## 2024-08-12 PROCEDURE — 99213 OFFICE O/P EST LOW 20 MIN: CPT | Performed by: FAMILY MEDICINE

## 2024-08-12 PROCEDURE — 87880 STREP A ASSAY W/OPTIC: CPT | Performed by: FAMILY MEDICINE

## 2024-08-12 RX ORDER — VALACYCLOVIR HYDROCHLORIDE 1 G/1
TABLET, FILM COATED ORAL
COMMUNITY
Start: 2024-08-09

## 2024-08-12 ASSESSMENT — ENCOUNTER SYMPTOMS
SHORTNESS OF BREATH: 0
VOMITING: 0
ABDOMINAL PAIN: 0
EYE ITCHING: 0
TROUBLE SWALLOWING: 0
SORE THROAT: 1
EYE DISCHARGE: 0
NAUSEA: 0
COUGH: 0

## 2024-08-12 NOTE — PROGRESS NOTES
Birgit Polanco (:  1960) is a 64 y.o. female,Established patient, here for evaluation of the following chief complaint(s):  Establish Care, Mouth Lesions (Was seen at urgent care over weekend ), and Pharyngitis (Started over the weekend )      ASSESSMENT/PLAN:  1. Oral thrush  -     nystatin (MYCOSTATIN) 006941 UNIT/ML suspension; 5 mL 4 times a day, swish and spit for 14 days, Disp-280 mL, R-0, Normal  2. Sore throat  -     POCT rapid strep A  -     Culture, Throat  -     nystatin (MYCOSTATIN) [id number] UNIT/ML suspension; 5 mL 4 times a day, swish and spit for 14 days, Disp-280 mL, R-0, Normal  3. Mouth sores  -     POCT rapid strep A  -     Culture, Throat  -     nystatin (MYCOSTATIN) [id number] UNIT/ML suspension; 5 mL 4 times a day, swish and spit for 14 days, Disp-280 mL, R-0, Normal    Patient likely had cold sores, received treatment from urgent care  Overall feeling better but the sore throat is new  Mouth exam shows thrush, will treat  However patient concerned as she is leaving day after tomorrow for her son's wedding and does not want to be sick  Will test for strep, if negative strep throat culture which will likely result tomorrow  Did discuss a prescription of antibiotics to keep on hand since she is traveling out of town just in case she develops other symptoms  Until then continue the statin as prescribed  Patient is agreeable to plan and demonstrates understand    No follow-ups on file.    SUBJECTIVE/OBJECTIVE:  Mouth Lesions   Associated symptoms include mouth sores and sore throat. Pertinent negatives include no fever, no eye itching, no abdominal pain, no diarrhea, no nausea, no vomiting, no congestion, no ear pain, no headaches, no neck pain, no cough and no eye discharge.   Pharyngitis  Associated symptoms include a sore throat. Pertinent negatives include no abdominal pain, chest pain, chills, congestion, coughing, fatigue, fever, headaches, joint swelling, nausea, neck pain or vomiting.

## 2024-08-14 LAB — BACTERIA THROAT AEROBE CULT: NORMAL

## 2024-08-22 ENCOUNTER — PATIENT MESSAGE (OUTPATIENT)
Dept: PRIMARY CARE CLINIC | Age: 64
End: 2024-08-22

## 2024-08-22 DIAGNOSIS — J02.9 SORE THROAT: ICD-10-CM

## 2024-08-22 DIAGNOSIS — B37.0 ORAL THRUSH: ICD-10-CM

## 2024-08-22 DIAGNOSIS — K13.79 MOUTH SORES: ICD-10-CM

## 2024-08-22 NOTE — TELEPHONE ENCOUNTER
Medication:   Requested Prescriptions     Pending Prescriptions Disp Refills    nystatin (MYCOSTATIN) [id number] UNIT/ML suspension 280 mL 0     Si mL 4 times a day, swish and spit for 14 days     Last Filled:  24    Last appt: 2024   Next appt: Visit date not found    Last OARRS:        No data to display

## 2024-08-23 LAB
CHOLESTEROL, TOTAL: 155 MG/DL (ref 125–199)
HDLC SERPL-MCNC: 81 MG/DL (ref 40–180)
LDL CHOLESTEROL: 62 MG/DL (ref 0–100)
NONHDLC SERPL-MCNC: 74 MG/DL (ref 0–129)
TRIGL SERPL-MCNC: 60 MG/DL (ref 0–149)

## 2024-10-15 LAB — PAP SMEAR, EXTERNAL: NEGATIVE

## 2024-10-16 LAB — PAP SMEAR, EXTERNAL: NEGATIVE

## 2024-10-30 DIAGNOSIS — E78.9 LIPID DISORDER: ICD-10-CM

## 2024-10-30 NOTE — TELEPHONE ENCOUNTER
Medication:   Requested Prescriptions     Pending Prescriptions Disp Refills    rosuvastatin (CRESTOR) 10 MG tablet [Pharmacy Med Name: ROSUVASTATIN CALCIUM 10 MG TAB] 90 tablet 1     Sig: TAKE ONE TABLET BY MOUTH ONCE NIGHTLY     Last Filled:  5/1/2024    Last appt: 8/12/2024   Next appt: Visit date not found    Last Lipid:   Lab Results   Component Value Date/Time    CHOL 155 08/23/2024 08:05 AM    TRIG 60 08/23/2024 08:05 AM    HDL 81 08/23/2024 08:05 AM    HDL 67 10/24/2011 12:00 AM

## 2024-10-31 RX ORDER — ROSUVASTATIN CALCIUM 10 MG/1
10 TABLET, COATED ORAL NIGHTLY
Qty: 90 TABLET | Refills: 1 | Status: SHIPPED | OUTPATIENT
Start: 2024-10-31

## 2025-02-03 ASSESSMENT — PATIENT HEALTH QUESTIONNAIRE - PHQ9
2. FEELING DOWN, DEPRESSED OR HOPELESS: NOT AT ALL
1. LITTLE INTEREST OR PLEASURE IN DOING THINGS: NOT AT ALL
2. FEELING DOWN, DEPRESSED OR HOPELESS: NOT AT ALL
SUM OF ALL RESPONSES TO PHQ9 QUESTIONS 1 & 2: 0
SUM OF ALL RESPONSES TO PHQ9 QUESTIONS 1 & 2: 0
SUM OF ALL RESPONSES TO PHQ QUESTIONS 1-9: 0
SUM OF ALL RESPONSES TO PHQ QUESTIONS 1-9: 0
1. LITTLE INTEREST OR PLEASURE IN DOING THINGS: NOT AT ALL
SUM OF ALL RESPONSES TO PHQ QUESTIONS 1-9: 0
SUM OF ALL RESPONSES TO PHQ QUESTIONS 1-9: 0

## 2025-02-06 ENCOUNTER — OFFICE VISIT (OUTPATIENT)
Dept: PRIMARY CARE CLINIC | Age: 65
End: 2025-02-06
Payer: COMMERCIAL

## 2025-02-06 VITALS
OXYGEN SATURATION: 98 % | HEIGHT: 63 IN | TEMPERATURE: 97.1 F | HEART RATE: 86 BPM | WEIGHT: 132.2 LBS | BODY MASS INDEX: 23.42 KG/M2 | DIASTOLIC BLOOD PRESSURE: 70 MMHG | SYSTOLIC BLOOD PRESSURE: 122 MMHG

## 2025-02-06 DIAGNOSIS — H04.123 DRY EYES, BILATERAL: ICD-10-CM

## 2025-02-06 DIAGNOSIS — Q13.2 GLAUCOMA ASSOCIATED WITH ANOMALIES OF IRIS: ICD-10-CM

## 2025-02-06 DIAGNOSIS — M81.0 POST-MENOPAUSAL OSTEOPOROSIS: ICD-10-CM

## 2025-02-06 DIAGNOSIS — E55.9 VITAMIN D DEFICIENCY: ICD-10-CM

## 2025-02-06 DIAGNOSIS — Z00.00 ANNUAL PHYSICAL EXAM: Primary | ICD-10-CM

## 2025-02-06 DIAGNOSIS — H40.50X0 GLAUCOMA ASSOCIATED WITH ANOMALIES OF IRIS: ICD-10-CM

## 2025-02-06 DIAGNOSIS — Z23 NEED FOR PNEUMOCOCCAL 20-VALENT CONJUGATE VACCINATION: ICD-10-CM

## 2025-02-06 DIAGNOSIS — E78.2 MIXED HYPERLIPIDEMIA: ICD-10-CM

## 2025-02-06 PROCEDURE — 90471 IMMUNIZATION ADMIN: CPT | Performed by: FAMILY MEDICINE

## 2025-02-06 PROCEDURE — 90677 PCV20 VACCINE IM: CPT | Performed by: FAMILY MEDICINE

## 2025-02-06 PROCEDURE — 99396 PREV VISIT EST AGE 40-64: CPT | Performed by: FAMILY MEDICINE

## 2025-02-06 SDOH — ECONOMIC STABILITY: FOOD INSECURITY: WITHIN THE PAST 12 MONTHS, THE FOOD YOU BOUGHT JUST DIDN'T LAST AND YOU DIDN'T HAVE MONEY TO GET MORE.: NEVER TRUE

## 2025-02-06 SDOH — ECONOMIC STABILITY: FOOD INSECURITY: WITHIN THE PAST 12 MONTHS, YOU WORRIED THAT YOUR FOOD WOULD RUN OUT BEFORE YOU GOT MONEY TO BUY MORE.: NEVER TRUE

## 2025-02-06 ASSESSMENT — ENCOUNTER SYMPTOMS
NAUSEA: 0
EYE DISCHARGE: 0
SORE THROAT: 0
SHORTNESS OF BREATH: 0
DIARRHEA: 0
EYE ITCHING: 0
ABDOMINAL PAIN: 0
TROUBLE SWALLOWING: 0
COUGH: 0
VOMITING: 0

## 2025-02-06 NOTE — PROGRESS NOTES
2025    Birgit Polanco (:  1960) is a 64 y.o. female, here for a preventive medicine evaluation.    Subjective   Patient Active Problem List   Diagnosis    Menopause    Colon polyp    Post-menopausal osteoporosis    Vitamin D deficiency    Thoracic compression fracture, sequela    Glaucoma associated with anomalies of iris    Pseudophakia    Mixed hyperlipidemia    History of cornea transplant    Dry eyes, bilateral     Annual Physical   - labs       Glaucoma  Dry Eyes  History of cornea transplant  - [facility]  - eye drops      HLD  - crestor  - taking as Rx    Vit d Def  - not on meds at this time    Osteoporosis  - on prolia now  - was on avanity shots every month in the past  - follows with [facility]     Review of Systems   Constitutional:  Negative for appetite change, chills, fatigue and fever.   HENT:  Negative for congestion, ear pain, sneezing, sore throat and trouble swallowing.    Eyes:  Positive for visual disturbance (chronic, follows with [facility] every 3 weeks). Negative for discharge and itching.   Respiratory:  Negative for cough and shortness of breath.    Cardiovascular:  Negative for chest pain and leg swelling.   Gastrointestinal:  Negative for abdominal pain, diarrhea, nausea and vomiting.   Genitourinary:  Negative for dysuria and urgency.   Musculoskeletal:  Negative for joint swelling and neck pain.   Neurological:  Negative for light-headedness and headaches.   Psychiatric/Behavioral:  Negative for dysphoric mood. The patient is not nervous/anxious.        Prior to Visit Medications    Medication Sig Taking? Authorizing Provider   rosuvastatin (CRESTOR) 10 MG tablet TAKE ONE TABLET BY MOUTH ONCE NIGHTLY Yes Sharmin Rockwell MD   latanoprost (XALATAN) 0.005 % ophthalmic solution  Yes ProviderHeather MD   NONFORMULARY [facility]  two capsules daily Yes ProviderHeather MD   brimonidine (ALPHAGAN P) 0.15 % ophthalmic solution Place 1 drop

## 2025-02-14 LAB
A/G RATIO: 1.4 (ref 1–2.1)
ALBUMIN: 4.2 G/DL (ref 3.5–5)
ALP BLD-CCNC: 52 U/L (ref 33–140)
ALT SERPL-CCNC: 15 U/L (ref 0–40)
ANION GAP SERPL CALCULATED.3IONS-SCNC: 8 MMOL/L (ref 5–13)
AST SERPL-CCNC: 26 U/L (ref 0–30)
BASOPHILS ABSOLUTE: 0.1 10*3/UL (ref 0–0.2)
BASOPHILS RELATIVE PERCENT: 0.8 %
BILIRUB SERPL-MCNC: 0.7 MG/DL (ref 0.2–1.2)
BUN / CREAT RATIO: 17
BUN BLDV-MCNC: 13 MG/DL (ref 7–25)
CALCIDIOL + CALCIFEROL: 55 NG/ML (ref 30–80)
CALCIUM SERPL-MCNC: 9.5 MG/DL (ref 8.5–10.5)
CHLORIDE BLD-SCNC: 105 MMOL/L (ref 98–110)
CHOLESTEROL, TOTAL: 159 MG/DL (ref 125–199)
CO2: 27 MMOL/L (ref 22–29)
CREAT SERPL-MCNC: 0.75 MG/DL (ref 0.5–1.2)
EGFR (CKD-EPI): 89 SEE NOTE
EOSINOPHILS ABSOLUTE: 0 10*3/UL (ref 0–0.5)
EOSINOPHILS RELATIVE PERCENT: 0.1 %
ESTIMATED AVERAGE GLUCOSE: 114 MG/DL (ref 68–114)
GLOBULIN: 3.1 G/DL (ref 2–3.7)
GLUCOSE BLD-MCNC: 115 MG/DL (ref 71–99)
GRANULOCYTE ABSOLUTE COUNT: 5.5 10*3/UL (ref 1.5–7.8)
HBA1C MFR BLD: 5.6 % (ref 4–5.6)
HCT VFR BLD CALC: 48 % (ref 35–46)
HDLC SERPL-MCNC: 84 MG/DL (ref 40–180)
HEMOGLOBIN: 15 G/DL (ref 11.7–15.5)
IMMATURE GRANULOCYTES %: 0 10*3/UL (ref 0–0)
IMMATURE GRANULOCYTES %: 0.2 % (ref 0–0)
LDL CHOLESTEROL: 60 MG/DL (ref 0–100)
LEUKOCYTES, BLD: 8.3 10*3/UL (ref 4–12)
LYMPHOCYTES ABSOLUTE: 2.1 10*3/UL (ref 0.8–3.9)
LYMPHOCYTES RELATIVE PERCENT: 25.5 %
MCH RBC QN AUTO: 31 PG (ref 27–33)
MCHC RBC AUTO-ENTMCNC: 31.3 G/DL (ref 30–36)
MCV RBC AUTO: 99.2 FL (ref 80–100)
MONOCYTES ABSOLUTE: 0.6 10*3/UL (ref 0.2–0.9)
MONOCYTES RELATIVE PERCENT: 7.3 %
NEUTROPHILS RELATIVE PERCENT: 66.1 %
NONHDLC SERPL-MCNC: 75 MG/DL (ref 0–129)
PDW BLD-RTO: 12.6 % (ref 11–15)
PLATELET # BLD: 248 10*3/UL (ref 140–400)
PMV BLD AUTO: 9.1 FL (ref 7.5–11.5)
POTASSIUM SERPL-SCNC: 4.4 MMOL/L (ref 3.5–5.1)
PROTEIN FLUID: 7.3 G/DL (ref 6–8)
RBC # BLD: 4.84 10*6/UL (ref 3.8–5.1)
SCAN DIFF: ABNORMAL
SODIUM BLD-SCNC: 140 MMOL/L (ref 135–146)
TRIGL SERPL-MCNC: 74 MG/DL (ref 0–149)

## 2025-05-01 DIAGNOSIS — E78.9 LIPID DISORDER: ICD-10-CM

## 2025-05-01 RX ORDER — ROSUVASTATIN CALCIUM 10 MG/1
10 TABLET, COATED ORAL NIGHTLY
Qty: 90 TABLET | Refills: 1 | Status: SHIPPED | OUTPATIENT
Start: 2025-05-01

## 2025-05-01 NOTE — TELEPHONE ENCOUNTER
PT is requesting a refill for the following:    rosuvastatin (CRESTOR) 10 MG tablet     [id number] - [place], [place] - [address] - P [phone] - F [phone] [address] [place] [address] [place] [id number]

## 2025-05-01 NOTE — TELEPHONE ENCOUNTER
Medication:   Requested Prescriptions     Pending Prescriptions Disp Refills    rosuvastatin (CRESTOR) 10 MG tablet 90 tablet 1     Sig: Take 1 tablet by mouth nightly     Last Filled:  10/31/2024    Last appt: 2/6/2025   Next appt: Visit date not found    Last Lipid:   Lab Results   Component Value Date/Time    CHOL 159 02/14/2025 08:37 AM    TRIG 74 02/14/2025 08:37 AM    HDL 84 02/14/2025 08:37 AM    HDL 67 10/24/2011 12:00 AM

## 2025-05-14 ENCOUNTER — RESULTS FOLLOW-UP (OUTPATIENT)
Dept: PRIMARY CARE CLINIC | Age: 65
End: 2025-05-14